# Patient Record
Sex: FEMALE | Race: WHITE | NOT HISPANIC OR LATINO | Employment: OTHER | URBAN - METROPOLITAN AREA
[De-identification: names, ages, dates, MRNs, and addresses within clinical notes are randomized per-mention and may not be internally consistent; named-entity substitution may affect disease eponyms.]

---

## 2017-01-10 DIAGNOSIS — I48.0 PAROXYSMAL ATRIAL FIBRILLATION (HCC): ICD-10-CM

## 2017-01-16 ENCOUNTER — TRANSCRIBE ORDERS (OUTPATIENT)
Dept: ADMINISTRATIVE | Facility: HOSPITAL | Age: 72
End: 2017-01-16

## 2017-01-16 DIAGNOSIS — Z13.9 SCREENING: Primary | ICD-10-CM

## 2017-01-18 ENCOUNTER — HOSPITAL ENCOUNTER (OUTPATIENT)
Dept: MAMMOGRAPHY | Facility: HOSPITAL | Age: 72
Discharge: HOME | End: 2017-01-18
Admitting: FAMILY MEDICINE

## 2017-01-18 DIAGNOSIS — Z13.9 SCREENING: ICD-10-CM

## 2017-01-18 PROCEDURE — G0202 SCR MAMMO BI INCL CAD: HCPCS

## 2017-01-18 PROCEDURE — 77063 BREAST TOMOSYNTHESIS BI: CPT

## 2017-01-30 ENCOUNTER — TELEPHONE (OUTPATIENT)
Dept: CARDIOLOGY | Facility: CLINIC | Age: 72
End: 2017-01-30

## 2017-01-30 NOTE — TELEPHONE ENCOUNTER
Pt is calling to get surgery clearance for total right knee replacement on 2/14/2016 by . Pt last seen you on 12/8/2016. Please Advise. Fax #:[phone]    Thanks  Debbie

## 2017-01-31 ENCOUNTER — APPOINTMENT (OUTPATIENT)
Dept: PREADMISSION TESTING | Facility: HOSPITAL | Age: 72
End: 2017-01-31

## 2017-01-31 ENCOUNTER — HOSPITAL ENCOUNTER (OUTPATIENT)
Dept: GENERAL RADIOLOGY | Facility: HOSPITAL | Age: 72
Discharge: HOME | End: 2017-01-31
Admitting: ORTHOPAEDIC SURGERY

## 2017-01-31 VITALS
HEART RATE: 61 BPM | RESPIRATION RATE: 16 BRPM | DIASTOLIC BLOOD PRESSURE: 66 MMHG | SYSTOLIC BLOOD PRESSURE: 134 MMHG | BODY MASS INDEX: 28.51 KG/M2 | OXYGEN SATURATION: 98 % | HEIGHT: 64 IN | TEMPERATURE: 98.3 F | WEIGHT: 167 LBS

## 2017-01-31 DIAGNOSIS — G89.29 CHRONIC PAIN OF RIGHT KNEE: Primary | ICD-10-CM

## 2017-01-31 DIAGNOSIS — M25.561 CHRONIC PAIN OF RIGHT KNEE: Primary | ICD-10-CM

## 2017-01-31 LAB
ABO GROUP BLD: NORMAL
ALBUMIN SERPL-MCNC: 4.1 G/DL (ref 3.5–5.2)
ALBUMIN/GLOB SERPL: 1.7 G/DL
ALP SERPL-CCNC: 69 U/L (ref 39–117)
ALT SERPL W P-5'-P-CCNC: 10 U/L (ref 1–33)
ANION GAP SERPL CALCULATED.3IONS-SCNC: 13.9 MMOL/L
AST SERPL-CCNC: 13 U/L (ref 1–32)
BASOPHILS # BLD AUTO: 0.03 10*3/MM3 (ref 0–0.2)
BASOPHILS NFR BLD AUTO: 0.5 % (ref 0–1.5)
BILIRUB SERPL-MCNC: 0.4 MG/DL (ref 0.1–1.2)
BILIRUB UR QL STRIP: NEGATIVE
BLD GP AB SCN SERPL QL: NEGATIVE
BUN BLD-MCNC: 15 MG/DL (ref 8–23)
BUN/CREAT SERPL: 24.6 (ref 7–25)
CALCIUM SPEC-SCNC: 8.9 MG/DL (ref 8.6–10.5)
CHLORIDE SERPL-SCNC: 100 MMOL/L (ref 98–107)
CLARITY UR: CLEAR
CO2 SERPL-SCNC: 25.1 MMOL/L (ref 22–29)
COLOR UR: YELLOW
CREAT BLD-MCNC: 0.61 MG/DL (ref 0.57–1)
DEPRECATED RDW RBC AUTO: 44.1 FL (ref 37–54)
EOSINOPHIL # BLD AUTO: 0.26 10*3/MM3 (ref 0–0.7)
EOSINOPHIL NFR BLD AUTO: 4.2 % (ref 0.3–6.2)
ERYTHROCYTE [DISTWIDTH] IN BLOOD BY AUTOMATED COUNT: 13.4 % (ref 11.7–13)
GFR SERPL CREATININE-BSD FRML MDRD: 96 ML/MIN/1.73
GLOBULIN UR ELPH-MCNC: 2.4 GM/DL
GLUCOSE BLD-MCNC: 234 MG/DL (ref 65–99)
GLUCOSE UR STRIP-MCNC: NEGATIVE MG/DL
HCT VFR BLD AUTO: 40.3 % (ref 35.6–45.5)
HGB BLD-MCNC: 12.5 G/DL (ref 11.9–15.5)
HGB UR QL STRIP.AUTO: NEGATIVE
IMM GRANULOCYTES # BLD: 0.02 10*3/MM3 (ref 0–0.03)
IMM GRANULOCYTES NFR BLD: 0.3 % (ref 0–0.5)
KETONES UR QL STRIP: NEGATIVE
LEUKOCYTE ESTERASE UR QL STRIP.AUTO: NEGATIVE
LYMPHOCYTES # BLD AUTO: 1.76 10*3/MM3 (ref 0.9–4.8)
LYMPHOCYTES NFR BLD AUTO: 28.2 % (ref 19.6–45.3)
MCH RBC QN AUTO: 28 PG (ref 26.9–32)
MCHC RBC AUTO-ENTMCNC: 31 G/DL (ref 32.4–36.3)
MCV RBC AUTO: 90.4 FL (ref 80.5–98.2)
MONOCYTES # BLD AUTO: 0.37 10*3/MM3 (ref 0.2–1.2)
MONOCYTES NFR BLD AUTO: 5.9 % (ref 5–12)
NEUTROPHILS # BLD AUTO: 3.8 10*3/MM3 (ref 1.9–8.1)
NEUTROPHILS NFR BLD AUTO: 60.9 % (ref 42.7–76)
NITRITE UR QL STRIP: NEGATIVE
PH UR STRIP.AUTO: 6.5 [PH] (ref 5–8)
PLATELET # BLD AUTO: 284 10*3/MM3 (ref 140–500)
PMV BLD AUTO: 10.4 FL (ref 6–12)
POTASSIUM BLD-SCNC: 4 MMOL/L (ref 3.5–5.2)
PROT SERPL-MCNC: 6.5 G/DL (ref 6–8.5)
PROT UR QL STRIP: NEGATIVE
RBC # BLD AUTO: 4.46 10*6/MM3 (ref 3.9–5.2)
RH BLD: POSITIVE
SODIUM BLD-SCNC: 139 MMOL/L (ref 136–145)
SP GR UR STRIP: 1.02 (ref 1–1.03)
UROBILINOGEN UR QL STRIP: NORMAL
WBC NRBC COR # BLD: 6.24 10*3/MM3 (ref 4.5–10.7)

## 2017-01-31 PROCEDURE — G8979 MOBILITY GOAL STATUS: HCPCS

## 2017-01-31 PROCEDURE — 97161 PT EVAL LOW COMPLEX 20 MIN: CPT

## 2017-01-31 PROCEDURE — 80053 COMPREHEN METABOLIC PANEL: CPT | Performed by: ORTHOPAEDIC SURGERY

## 2017-01-31 PROCEDURE — 86901 BLOOD TYPING SEROLOGIC RH(D): CPT

## 2017-01-31 PROCEDURE — 73560 X-RAY EXAM OF KNEE 1 OR 2: CPT

## 2017-01-31 PROCEDURE — G8978 MOBILITY CURRENT STATUS: HCPCS

## 2017-01-31 PROCEDURE — 86850 RBC ANTIBODY SCREEN: CPT

## 2017-01-31 PROCEDURE — 86900 BLOOD TYPING SEROLOGIC ABO: CPT

## 2017-01-31 PROCEDURE — 36415 COLL VENOUS BLD VENIPUNCTURE: CPT

## 2017-01-31 PROCEDURE — 85025 COMPLETE CBC W/AUTO DIFF WBC: CPT | Performed by: ORTHOPAEDIC SURGERY

## 2017-01-31 PROCEDURE — G8980 MOBILITY D/C STATUS: HCPCS

## 2017-01-31 PROCEDURE — 81003 URINALYSIS AUTO W/O SCOPE: CPT | Performed by: ORTHOPAEDIC SURGERY

## 2017-01-31 RX ORDER — AMOXICILLIN 250 MG
2 CAPSULE ORAL AS NEEDED
COMMUNITY
End: 2022-03-16

## 2017-01-31 RX ORDER — DOCUSATE SODIUM 100 MG/1
100 CAPSULE, LIQUID FILLED ORAL 2 TIMES DAILY PRN
COMMUNITY
End: 2017-02-16 | Stop reason: HOSPADM

## 2017-02-06 NOTE — PROGRESS NOTES
Pre-Operative Orthopedic Assessment      Patient: Carli Blair    YOB: 1945      Age/Gender: 72 y.o. female  Medical Record Number: [id number]  Surgical Procedure Planned: CO TOTAL KNEE ARTHROPLASTY [54228] (RT TOTAL KNEE ARTHROPLASTY)     Surgeon: Giovany Poole MD    Date of Surgery Planned: 2/14/17    Question Value Score    Patient Score   1. What is your age group? 50-65 years  66-75 years  >75 years =2  =1  =0 1   2. Gender? Male  Female =2  =1 1   3. How far on average can you walk? (a block is 200 meters) Two blocks or more (+\- rest)  1-2 blocks (+\- rest)  Housebound (most of time) =2  =1  =0 2   4. Which gait aid to you use? (more often than not) None  Single-Point Stick  Crutches/Frame =2  =1  =0 2   5. Do you use community  supports? (home help, meals on wheels, district nursing) None or one per week  Two or more per week =1  =0 0   6. Will you live with someone who can care for you after your operation? Yes  No =3  =0 0      Your Score (out of 12)  6     Key Destination at Discharge from acute care predicted by score   Scores < 6  -- extended inpatient rehabilitation   Scores 6-9 -- additional intervention to discharge directly home (Rehabilitation in home)   Scores > 9 -- directly home         Discharge Disposition/Planning:     Patient Response   Discussed the Predicted discharge disposition needed based on RAPT Assessment with the patient.    yes   Patient selected discharge disposition:   Home with [facility]   Out Patient Rehabilitation Facility of Choice:    [facility]   Home Health Services Preferred:   [facility]   Has the patient completed or been scheduled to complete pre-operative testing? yes   Post-Operative Care Giver Name and Phone Number:    Fidel Blair [phone]     Subacute Inpatient Rehabilitation:  Complete this section only if planning inpatient services at a Subacute Facility     Patient Response   Subacute Facility Preferred (Please  list 2 facilities:      Requires pre-certification for inpatient rehabilitation services?         Planned source of transportation to inpatient rehabilitation facility?       If choosing inpatient services at an Acute or Subacute Facility please list a subsequent back-up plan (in case patient fails to qualify for inpatient rehabilitation). Back-up plans should include caregiver (family member or friend) for first 24-48 post- -operatively.       Home Equipment Patient Response   Does patient have a walker for home use?    yes   Does patient have a 3 in 1 commode for home use?    no   Does patient have a shower chair for home use?    no   Does patient have an elevated commode seat for home use? yes   Does patient have a cane for home use?    no   Is there any other medical equipment in the home? If so,  List in comment section below n/a   Pre-Operative Class Attendance Patient Response   Attended or scheduled to attend the pre-operative class within 1 year of total joint replacement? Yes 1/31/17   Not planning to attend the pre-operative class       Has attended the pre-operative class > 1 year ago       Does not want to attend the class       Was misinformed that did not need to attend the class       Class time is not convenient       Other reasons for refusing to attend the pre-operative class (if yes, see comments below)      Patient Education  Completed   Expected time of discharge discussed yes   Encouraged to attend Pre-Operative Class    yes   Education re: Back-up plan for patients planning discharge to subacute facilities yes   Education re: Predicted Discharge Disposition based on RAPT score    yes   Patient receptive and voiced understanding of information given    yes                                                                                                            Comments:    Spoke with pt, verified correct information on facesheet and explained the role of CCP. Pt states she had her left knee  done a year ago but still plans on coming to the PAT class. Pt would like to d/c home with [facility] and will go to [facility] for outpatient. Pt states she lives in a single story home and has 3-4 steps to enter. No other needs identified.                                       Signature: Carissa Busch RN    Date:  2/6/2017

## 2017-02-14 ENCOUNTER — ANESTHESIA (OUTPATIENT)
Dept: PERIOP | Facility: HOSPITAL | Age: 72
End: 2017-02-14

## 2017-02-14 ENCOUNTER — ANESTHESIA EVENT (OUTPATIENT)
Dept: PERIOP | Facility: HOSPITAL | Age: 72
End: 2017-02-14

## 2017-02-14 ENCOUNTER — HOSPITAL ENCOUNTER (INPATIENT)
Facility: HOSPITAL | Age: 72
LOS: 2 days | Discharge: HOME HEALTH | End: 2017-02-16
Attending: ORTHOPAEDIC SURGERY | Admitting: ORTHOPAEDIC SURGERY

## 2017-02-14 DIAGNOSIS — R26.2 DIFFICULTY WALKING: Primary | ICD-10-CM

## 2017-02-14 LAB
GLUCOSE BLDC GLUCOMTR-MCNC: 157 MG/DL (ref 70–130)
GLUCOSE BLDC GLUCOMTR-MCNC: 159 MG/DL (ref 70–130)
GLUCOSE BLDC GLUCOMTR-MCNC: 232 MG/DL (ref 70–130)
GLUCOSE BLDC GLUCOMTR-MCNC: 307 MG/DL (ref 70–130)

## 2017-02-14 PROCEDURE — 25010000002 ONDANSETRON PER 1 MG: Performed by: ORTHOPAEDIC SURGERY

## 2017-02-14 PROCEDURE — 94799 UNLISTED PULMONARY SVC/PX: CPT

## 2017-02-14 PROCEDURE — 25010000002 ONDANSETRON PER 1 MG: Performed by: NURSE ANESTHETIST, CERTIFIED REGISTERED

## 2017-02-14 PROCEDURE — 25010000002 MIDAZOLAM PER 1 MG: Performed by: ANESTHESIOLOGY

## 2017-02-14 PROCEDURE — C1776 JOINT DEVICE (IMPLANTABLE): HCPCS | Performed by: ORTHOPAEDIC SURGERY

## 2017-02-14 PROCEDURE — 0SRC0J9 REPLACEMENT OF RIGHT KNEE JOINT WITH SYNTHETIC SUBSTITUTE, CEMENTED, OPEN APPROACH: ICD-10-PCS | Performed by: ORTHOPAEDIC SURGERY

## 2017-02-14 PROCEDURE — C1713 ANCHOR/SCREW BN/BN,TIS/BN: HCPCS | Performed by: ORTHOPAEDIC SURGERY

## 2017-02-14 PROCEDURE — 63710000001 INSULIN ASPART PER 5 UNITS: Performed by: HOSPITALIST

## 2017-02-14 PROCEDURE — 25010000003 CEFAZOLIN IN DEXTROSE 2-4 GM/100ML-% SOLUTION: Performed by: ORTHOPAEDIC SURGERY

## 2017-02-14 PROCEDURE — 25010000002 HYDROMORPHONE PER 4 MG: Performed by: NURSE ANESTHETIST, CERTIFIED REGISTERED

## 2017-02-14 PROCEDURE — 25010000002 PROPOFOL 10 MG/ML EMULSION: Performed by: NURSE ANESTHETIST, CERTIFIED REGISTERED

## 2017-02-14 PROCEDURE — 0T9B70Z DRAINAGE OF BLADDER WITH DRAINAGE DEVICE, VIA NATURAL OR ARTIFICIAL OPENING: ICD-10-PCS | Performed by: ORTHOPAEDIC SURGERY

## 2017-02-14 PROCEDURE — 25010000002 KETOROLAC TROMETHAMINE PER 15 MG: Performed by: ORTHOPAEDIC SURGERY

## 2017-02-14 PROCEDURE — 97110 THERAPEUTIC EXERCISES: CPT

## 2017-02-14 PROCEDURE — 97161 PT EVAL LOW COMPLEX 20 MIN: CPT

## 2017-02-14 PROCEDURE — 25010000002 FENTANYL CITRATE (PF) 100 MCG/2ML SOLUTION: Performed by: NURSE ANESTHETIST, CERTIFIED REGISTERED

## 2017-02-14 PROCEDURE — 82962 GLUCOSE BLOOD TEST: CPT

## 2017-02-14 PROCEDURE — 25010000002 SUCCINYLCHOLINE PER 20 MG: Performed by: NURSE ANESTHETIST, CERTIFIED REGISTERED

## 2017-02-14 PROCEDURE — 63710000001 INSULIN DETEMER PER 5 UNITS: Performed by: ORTHOPAEDIC SURGERY

## 2017-02-14 DEVICE — IMPLANTABLE DEVICE
Type: IMPLANTABLE DEVICE | Site: KNEE | Status: FUNCTIONAL
Brand: VANGUARD® KNEE SYSTEM

## 2017-02-14 DEVICE — IMPLANTABLE DEVICE
Type: IMPLANTABLE DEVICE | Site: KNEE | Status: FUNCTIONAL
Brand: VANGUARD KNEE SYSTEM

## 2017-02-14 DEVICE — IMPLANTABLE DEVICE
Type: IMPLANTABLE DEVICE | Site: KNEE | Status: FUNCTIONAL
Brand: BIOMET KNEE SYSTEM

## 2017-02-14 DEVICE — CAP TOTL KN CMT PREMIUM: Type: IMPLANTABLE DEVICE | Site: KNEE | Status: FUNCTIONAL

## 2017-02-14 DEVICE — IMPLANTABLE DEVICE: Type: IMPLANTABLE DEVICE | Site: KNEE | Status: FUNCTIONAL

## 2017-02-14 RX ORDER — HYDROCODONE BITARTRATE AND ACETAMINOPHEN 7.5; 325 MG/1; MG/1
1 TABLET ORAL EVERY 4 HOURS PRN
Status: DISCONTINUED | OUTPATIENT
Start: 2017-02-14 | End: 2017-02-16 | Stop reason: HOSPADM

## 2017-02-14 RX ORDER — NALOXONE HCL 0.4 MG/ML
0.2 VIAL (ML) INJECTION AS NEEDED
Status: DISCONTINUED | OUTPATIENT
Start: 2017-02-14 | End: 2017-02-14 | Stop reason: HOSPADM

## 2017-02-14 RX ORDER — SODIUM CHLORIDE, SODIUM LACTATE, POTASSIUM CHLORIDE, CALCIUM CHLORIDE 600; 310; 30; 20 MG/100ML; MG/100ML; MG/100ML; MG/100ML
9 INJECTION, SOLUTION INTRAVENOUS CONTINUOUS
Status: DISCONTINUED | OUTPATIENT
Start: 2017-02-14 | End: 2017-02-16 | Stop reason: HOSPADM

## 2017-02-14 RX ORDER — HYDROMORPHONE HYDROCHLORIDE 1 MG/ML
0.25 INJECTION, SOLUTION INTRAMUSCULAR; INTRAVENOUS; SUBCUTANEOUS
Status: DISCONTINUED | OUTPATIENT
Start: 2017-02-14 | End: 2017-02-14 | Stop reason: HOSPADM

## 2017-02-14 RX ORDER — MAGNESIUM HYDROXIDE 1200 MG/15ML
LIQUID ORAL AS NEEDED
Status: DISCONTINUED | OUTPATIENT
Start: 2017-02-14 | End: 2017-02-14 | Stop reason: HOSPADM

## 2017-02-14 RX ORDER — SODIUM CHLORIDE 0.9 % (FLUSH) 0.9 %
1-10 SYRINGE (ML) INJECTION AS NEEDED
Status: DISCONTINUED | OUTPATIENT
Start: 2017-02-14 | End: 2017-02-16 | Stop reason: HOSPADM

## 2017-02-14 RX ORDER — LIDOCAINE HYDROCHLORIDE 20 MG/ML
INJECTION, SOLUTION INFILTRATION; PERINEURAL AS NEEDED
Status: DISCONTINUED | OUTPATIENT
Start: 2017-02-14 | End: 2017-02-14 | Stop reason: SURG

## 2017-02-14 RX ORDER — PROPOFOL 10 MG/ML
VIAL (ML) INTRAVENOUS AS NEEDED
Status: DISCONTINUED | OUTPATIENT
Start: 2017-02-14 | End: 2017-02-14 | Stop reason: SURG

## 2017-02-14 RX ORDER — MIDAZOLAM HYDROCHLORIDE 1 MG/ML
1 INJECTION INTRAMUSCULAR; INTRAVENOUS
Status: DISCONTINUED | OUTPATIENT
Start: 2017-02-14 | End: 2017-02-14 | Stop reason: HOSPADM

## 2017-02-14 RX ORDER — DOCUSATE SODIUM 100 MG/1
100 CAPSULE, LIQUID FILLED ORAL 2 TIMES DAILY PRN
Status: DISCONTINUED | OUTPATIENT
Start: 2017-02-14 | End: 2017-02-16 | Stop reason: HOSPADM

## 2017-02-14 RX ORDER — HYDRALAZINE HYDROCHLORIDE 20 MG/ML
5 INJECTION INTRAMUSCULAR; INTRAVENOUS
Status: DISCONTINUED | OUTPATIENT
Start: 2017-02-14 | End: 2017-02-14 | Stop reason: HOSPADM

## 2017-02-14 RX ORDER — SUCCINYLCHOLINE CHLORIDE 20 MG/ML
INJECTION INTRAMUSCULAR; INTRAVENOUS AS NEEDED
Status: DISCONTINUED | OUTPATIENT
Start: 2017-02-14 | End: 2017-02-14 | Stop reason: SURG

## 2017-02-14 RX ORDER — NICOTINE POLACRILEX 4 MG
15 LOZENGE BUCCAL
Status: DISCONTINUED | OUTPATIENT
Start: 2017-02-14 | End: 2017-02-16 | Stop reason: HOSPADM

## 2017-02-14 RX ORDER — ROCURONIUM BROMIDE 10 MG/ML
INJECTION, SOLUTION INTRAVENOUS AS NEEDED
Status: DISCONTINUED | OUTPATIENT
Start: 2017-02-14 | End: 2017-02-14 | Stop reason: SURG

## 2017-02-14 RX ORDER — PRAVASTATIN SODIUM 40 MG
40 TABLET ORAL DAILY
Status: DISCONTINUED | OUTPATIENT
Start: 2017-02-14 | End: 2017-02-16 | Stop reason: HOSPADM

## 2017-02-14 RX ORDER — ONDANSETRON 2 MG/ML
4 INJECTION INTRAMUSCULAR; INTRAVENOUS ONCE AS NEEDED
Status: DISCONTINUED | OUTPATIENT
Start: 2017-02-14 | End: 2017-02-14 | Stop reason: HOSPADM

## 2017-02-14 RX ORDER — LABETALOL HYDROCHLORIDE 5 MG/ML
5 INJECTION, SOLUTION INTRAVENOUS
Status: DISCONTINUED | OUTPATIENT
Start: 2017-02-14 | End: 2017-02-14 | Stop reason: HOSPADM

## 2017-02-14 RX ORDER — METOPROLOL SUCCINATE 25 MG/1
25 TABLET, EXTENDED RELEASE ORAL EVERY MORNING
Status: DISCONTINUED | OUTPATIENT
Start: 2017-02-14 | End: 2017-02-16 | Stop reason: HOSPADM

## 2017-02-14 RX ORDER — ONDANSETRON 4 MG/1
4 TABLET, FILM COATED ORAL EVERY 6 HOURS PRN
Status: DISCONTINUED | OUTPATIENT
Start: 2017-02-14 | End: 2017-02-16 | Stop reason: HOSPADM

## 2017-02-14 RX ORDER — LEVOTHYROXINE SODIUM 112 UG/1
112 TABLET ORAL DAILY
Status: DISCONTINUED | OUTPATIENT
Start: 2017-02-14 | End: 2017-02-16 | Stop reason: HOSPADM

## 2017-02-14 RX ORDER — PROMETHAZINE HYDROCHLORIDE 25 MG/ML
6.25 INJECTION, SOLUTION INTRAMUSCULAR; INTRAVENOUS ONCE AS NEEDED
Status: DISCONTINUED | OUTPATIENT
Start: 2017-02-14 | End: 2017-02-14 | Stop reason: HOSPADM

## 2017-02-14 RX ORDER — ACETAMINOPHEN 10 MG/ML
INJECTION, SOLUTION INTRAVENOUS AS NEEDED
Status: DISCONTINUED | OUTPATIENT
Start: 2017-02-14 | End: 2017-02-14 | Stop reason: SURG

## 2017-02-14 RX ORDER — HYDROMORPHONE HCL 110MG/55ML
PATIENT CONTROLLED ANALGESIA SYRINGE INTRAVENOUS AS NEEDED
Status: DISCONTINUED | OUTPATIENT
Start: 2017-02-14 | End: 2017-02-14 | Stop reason: SURG

## 2017-02-14 RX ORDER — CETIRIZINE HYDROCHLORIDE 10 MG/1
10 TABLET ORAL DAILY
Status: DISCONTINUED | OUTPATIENT
Start: 2017-02-14 | End: 2017-02-16 | Stop reason: HOSPADM

## 2017-02-14 RX ORDER — FAMOTIDINE 10 MG/ML
20 INJECTION, SOLUTION INTRAVENOUS ONCE
Status: COMPLETED | OUTPATIENT
Start: 2017-02-14 | End: 2017-02-14

## 2017-02-14 RX ORDER — PROMETHAZINE HYDROCHLORIDE 25 MG/ML
12.5 INJECTION, SOLUTION INTRAMUSCULAR; INTRAVENOUS ONCE AS NEEDED
Status: DISCONTINUED | OUTPATIENT
Start: 2017-02-14 | End: 2017-02-14 | Stop reason: HOSPADM

## 2017-02-14 RX ORDER — MIDAZOLAM HYDROCHLORIDE 1 MG/ML
2 INJECTION INTRAMUSCULAR; INTRAVENOUS
Status: DISCONTINUED | OUTPATIENT
Start: 2017-02-14 | End: 2017-02-14 | Stop reason: HOSPADM

## 2017-02-14 RX ORDER — FLUMAZENIL 0.1 MG/ML
0.2 INJECTION INTRAVENOUS AS NEEDED
Status: DISCONTINUED | OUTPATIENT
Start: 2017-02-14 | End: 2017-02-14 | Stop reason: HOSPADM

## 2017-02-14 RX ORDER — CALCIUM CARBONATE 200(500)MG
1 TABLET,CHEWABLE ORAL DAILY
Status: DISCONTINUED | OUTPATIENT
Start: 2017-02-14 | End: 2017-02-16 | Stop reason: HOSPADM

## 2017-02-14 RX ORDER — PROMETHAZINE HYDROCHLORIDE 25 MG/1
25 SUPPOSITORY RECTAL ONCE AS NEEDED
Status: DISCONTINUED | OUTPATIENT
Start: 2017-02-14 | End: 2017-02-14 | Stop reason: HOSPADM

## 2017-02-14 RX ORDER — CEFAZOLIN SODIUM 2 G/100ML
2 INJECTION, SOLUTION INTRAVENOUS EVERY 8 HOURS
Status: COMPLETED | OUTPATIENT
Start: 2017-02-14 | End: 2017-02-15

## 2017-02-14 RX ORDER — DEXTROSE MONOHYDRATE 25 G/50ML
25 INJECTION, SOLUTION INTRAVENOUS
Status: DISCONTINUED | OUTPATIENT
Start: 2017-02-14 | End: 2017-02-16 | Stop reason: HOSPADM

## 2017-02-14 RX ORDER — ONDANSETRON 2 MG/ML
INJECTION INTRAMUSCULAR; INTRAVENOUS AS NEEDED
Status: DISCONTINUED | OUTPATIENT
Start: 2017-02-14 | End: 2017-02-14 | Stop reason: SURG

## 2017-02-14 RX ORDER — ONDANSETRON 2 MG/ML
4 INJECTION INTRAMUSCULAR; INTRAVENOUS EVERY 6 HOURS PRN
Status: DISCONTINUED | OUTPATIENT
Start: 2017-02-14 | End: 2017-02-16 | Stop reason: HOSPADM

## 2017-02-14 RX ORDER — SODIUM CHLORIDE 0.9 % (FLUSH) 0.9 %
1-10 SYRINGE (ML) INJECTION AS NEEDED
Status: DISCONTINUED | OUTPATIENT
Start: 2017-02-14 | End: 2017-02-14 | Stop reason: HOSPADM

## 2017-02-14 RX ORDER — CALCIUM CARBONATE 200(500)MG
1 TABLET,CHEWABLE ORAL DAILY
COMMUNITY
End: 2017-06-07

## 2017-02-14 RX ORDER — SODIUM CHLORIDE, SODIUM LACTATE, POTASSIUM CHLORIDE, CALCIUM CHLORIDE 600; 310; 30; 20 MG/100ML; MG/100ML; MG/100ML; MG/100ML
75 INJECTION, SOLUTION INTRAVENOUS CONTINUOUS
Status: DISCONTINUED | OUTPATIENT
Start: 2017-02-14 | End: 2017-02-15

## 2017-02-14 RX ORDER — DIPHENHYDRAMINE HYDROCHLORIDE 50 MG/ML
12.5 INJECTION INTRAMUSCULAR; INTRAVENOUS
Status: DISCONTINUED | OUTPATIENT
Start: 2017-02-14 | End: 2017-02-14 | Stop reason: HOSPADM

## 2017-02-14 RX ORDER — HYDROMORPHONE HYDROCHLORIDE 1 MG/ML
0.5 INJECTION, SOLUTION INTRAMUSCULAR; INTRAVENOUS; SUBCUTANEOUS
Status: DISCONTINUED | OUTPATIENT
Start: 2017-02-14 | End: 2017-02-16 | Stop reason: HOSPADM

## 2017-02-14 RX ORDER — FENTANYL CITRATE 50 UG/ML
50 INJECTION, SOLUTION INTRAMUSCULAR; INTRAVENOUS
Status: DISCONTINUED | OUTPATIENT
Start: 2017-02-14 | End: 2017-02-14 | Stop reason: HOSPADM

## 2017-02-14 RX ORDER — KETOROLAC TROMETHAMINE 30 MG/ML
15 INJECTION, SOLUTION INTRAMUSCULAR; INTRAVENOUS EVERY 6 HOURS
Status: COMPLETED | OUTPATIENT
Start: 2017-02-14 | End: 2017-02-15

## 2017-02-14 RX ORDER — CEFAZOLIN SODIUM 2 G/100ML
2 INJECTION, SOLUTION INTRAVENOUS ONCE
Status: COMPLETED | OUTPATIENT
Start: 2017-02-14 | End: 2017-02-14

## 2017-02-14 RX ORDER — ONDANSETRON 4 MG/1
4 TABLET, ORALLY DISINTEGRATING ORAL EVERY 6 HOURS PRN
Status: DISCONTINUED | OUTPATIENT
Start: 2017-02-14 | End: 2017-02-16 | Stop reason: HOSPADM

## 2017-02-14 RX ORDER — NALOXONE HCL 0.4 MG/ML
0.1 VIAL (ML) INJECTION
Status: DISCONTINUED | OUTPATIENT
Start: 2017-02-14 | End: 2017-02-16 | Stop reason: HOSPADM

## 2017-02-14 RX ORDER — SENNA AND DOCUSATE SODIUM 50; 8.6 MG/1; MG/1
2 TABLET, FILM COATED ORAL DAILY
Status: DISCONTINUED | OUTPATIENT
Start: 2017-02-14 | End: 2017-02-16 | Stop reason: HOSPADM

## 2017-02-14 RX ORDER — ACETAMINOPHEN 500 MG
1000 TABLET ORAL EVERY 6 HOURS PRN
Status: DISCONTINUED | OUTPATIENT
Start: 2017-02-14 | End: 2017-02-16 | Stop reason: HOSPADM

## 2017-02-14 RX ORDER — HYDROMORPHONE HYDROCHLORIDE 1 MG/ML
0.5 INJECTION, SOLUTION INTRAMUSCULAR; INTRAVENOUS; SUBCUTANEOUS
Status: DISCONTINUED | OUTPATIENT
Start: 2017-02-14 | End: 2017-02-14 | Stop reason: HOSPADM

## 2017-02-14 RX ORDER — FENTANYL CITRATE 50 UG/ML
INJECTION, SOLUTION INTRAMUSCULAR; INTRAVENOUS AS NEEDED
Status: DISCONTINUED | OUTPATIENT
Start: 2017-02-14 | End: 2017-02-14 | Stop reason: SURG

## 2017-02-14 RX ORDER — ACETAMINOPHEN 325 MG/1
325 TABLET ORAL 4 TIMES DAILY
Status: DISCONTINUED | OUTPATIENT
Start: 2017-02-14 | End: 2017-02-16 | Stop reason: HOSPADM

## 2017-02-14 RX ORDER — PROMETHAZINE HYDROCHLORIDE 25 MG/1
25 TABLET ORAL ONCE AS NEEDED
Status: DISCONTINUED | OUTPATIENT
Start: 2017-02-14 | End: 2017-02-14 | Stop reason: HOSPADM

## 2017-02-14 RX ORDER — PAROXETINE 30 MG/1
30 TABLET, FILM COATED ORAL DAILY
Status: DISCONTINUED | OUTPATIENT
Start: 2017-02-14 | End: 2017-02-16 | Stop reason: HOSPADM

## 2017-02-14 RX ORDER — DIPHENHYDRAMINE HCL 25 MG
25 CAPSULE ORAL EVERY 6 HOURS PRN
Status: DISCONTINUED | OUTPATIENT
Start: 2017-02-14 | End: 2017-02-16 | Stop reason: HOSPADM

## 2017-02-14 RX ADMIN — HYDROMORPHONE HYDROCHLORIDE 0.5 MG: 2 INJECTION, SOLUTION INTRAMUSCULAR; INTRAVENOUS; SUBCUTANEOUS at 08:30

## 2017-02-14 RX ADMIN — HYDROCODONE BITARTRATE AND ACETAMINOPHEN 1 TABLET: 7.5; 325 TABLET ORAL at 11:57

## 2017-02-14 RX ADMIN — FENTANYL CITRATE 100 MCG: 50 INJECTION, SOLUTION INTRAMUSCULAR; INTRAVENOUS at 07:01

## 2017-02-14 RX ADMIN — INSULIN DETEMIR 25 UNITS: 100 INJECTION, SOLUTION SUBCUTANEOUS at 22:28

## 2017-02-14 RX ADMIN — METFORMIN HYDROCHLORIDE 500 MG: 500 TABLET ORAL at 17:03

## 2017-02-14 RX ADMIN — ONDANSETRON 4 MG: 2 INJECTION INTRAMUSCULAR; INTRAVENOUS at 12:48

## 2017-02-14 RX ADMIN — SODIUM CHLORIDE, POTASSIUM CHLORIDE, SODIUM LACTATE AND CALCIUM CHLORIDE 75 ML/HR: 600; 310; 30; 20 INJECTION, SOLUTION INTRAVENOUS at 12:48

## 2017-02-14 RX ADMIN — KETOROLAC TROMETHAMINE 15 MG: 30 INJECTION, SOLUTION INTRAMUSCULAR at 22:03

## 2017-02-14 RX ADMIN — HYDROCODONE BITARTRATE AND ACETAMINOPHEN 1 TABLET: 7.5; 325 TABLET ORAL at 16:12

## 2017-02-14 RX ADMIN — MIDAZOLAM 2 MG: 1 INJECTION INTRAMUSCULAR; INTRAVENOUS at 06:48

## 2017-02-14 RX ADMIN — FENTANYL CITRATE 50 MCG: 50 INJECTION INTRAMUSCULAR; INTRAVENOUS at 08:56

## 2017-02-14 RX ADMIN — HYDROMORPHONE HYDROCHLORIDE 0.5 MG: 2 INJECTION, SOLUTION INTRAMUSCULAR; INTRAVENOUS; SUBCUTANEOUS at 08:05

## 2017-02-14 RX ADMIN — HYDROMORPHONE HYDROCHLORIDE 0.5 MG: 2 INJECTION, SOLUTION INTRAMUSCULAR; INTRAVENOUS; SUBCUTANEOUS at 08:18

## 2017-02-14 RX ADMIN — ONDANSETRON 4 MG: 2 INJECTION INTRAMUSCULAR; INTRAVENOUS at 07:45

## 2017-02-14 RX ADMIN — ROCURONIUM BROMIDE 10 MG: 10 INJECTION INTRAVENOUS at 07:01

## 2017-02-14 RX ADMIN — KETOROLAC TROMETHAMINE 15 MG: 30 INJECTION, SOLUTION INTRAMUSCULAR at 18:46

## 2017-02-14 RX ADMIN — SODIUM CHLORIDE, POTASSIUM CHLORIDE, SODIUM LACTATE AND CALCIUM CHLORIDE 9 ML/HR: 600; 310; 30; 20 INJECTION, SOLUTION INTRAVENOUS at 06:47

## 2017-02-14 RX ADMIN — LIDOCAINE HYDROCHLORIDE 60 MG: 20 INJECTION, SOLUTION INFILTRATION; PERINEURAL at 07:01

## 2017-02-14 RX ADMIN — KETOROLAC TROMETHAMINE 15 MG: 30 INJECTION, SOLUTION INTRAMUSCULAR at 12:53

## 2017-02-14 RX ADMIN — FENTANYL CITRATE 50 MCG: 50 INJECTION, SOLUTION INTRAMUSCULAR; INTRAVENOUS at 07:15

## 2017-02-14 RX ADMIN — CEFAZOLIN SODIUM 2 G: 2 INJECTION, SOLUTION INTRAVENOUS at 06:59

## 2017-02-14 RX ADMIN — SUCCINYLCHOLINE CHLORIDE 120 MG: 20 INJECTION, SOLUTION INTRAMUSCULAR; INTRAVENOUS; PARENTERAL at 07:01

## 2017-02-14 RX ADMIN — CEFAZOLIN SODIUM 2 G: 2 INJECTION, SOLUTION INTRAVENOUS at 07:51

## 2017-02-14 RX ADMIN — FENTANYL CITRATE 50 MCG: 50 INJECTION, SOLUTION INTRAMUSCULAR; INTRAVENOUS at 07:21

## 2017-02-14 RX ADMIN — FAMOTIDINE 20 MG: 10 INJECTION, SOLUTION INTRAVENOUS at 06:47

## 2017-02-14 RX ADMIN — ACETAMINOPHEN 1000 MG: 10 INJECTION, SOLUTION INTRAVENOUS at 07:10

## 2017-02-14 RX ADMIN — CEFAZOLIN SODIUM 2 G: 2 INJECTION, SOLUTION INTRAVENOUS at 16:05

## 2017-02-14 RX ADMIN — FENTANYL CITRATE 50 MCG: 50 INJECTION, SOLUTION INTRAMUSCULAR; INTRAVENOUS at 07:25

## 2017-02-14 RX ADMIN — EPHEDRINE SULFATE 10 MG: 50 INJECTION INTRAMUSCULAR; INTRAVENOUS; SUBCUTANEOUS at 07:11

## 2017-02-14 RX ADMIN — PROPOFOL 150 MG: 10 INJECTION, EMULSION INTRAVENOUS at 07:01

## 2017-02-14 RX ADMIN — HYDROMORPHONE HYDROCHLORIDE 0.5 MG: 2 INJECTION, SOLUTION INTRAMUSCULAR; INTRAVENOUS; SUBCUTANEOUS at 08:36

## 2017-02-14 RX ADMIN — INSULIN ASPART 7 UNITS: 100 INJECTION, SOLUTION INTRAVENOUS; SUBCUTANEOUS at 22:02

## 2017-02-14 RX ADMIN — SODIUM CHLORIDE, POTASSIUM CHLORIDE, SODIUM LACTATE AND CALCIUM CHLORIDE: 600; 310; 30; 20 INJECTION, SOLUTION INTRAVENOUS at 07:38

## 2017-02-14 NOTE — PROGRESS NOTES
Discharge Planning Assessment  [facility]     Patient Name: Carli Blair  MRN: [id number]  Today's Date: 2/14/2017    Admit Date: 2/14/2017          Discharge Needs Assessment       02/14/17 1658    Living Environment    Lives With spouse    Living Arrangements house    Quality Of Family Relationships supportive;helpful;involved    Able to Return to Prior Living Arrangements yes    Discharge Needs Assessment    Equipment Currently Used at Home walker, standard    Equipment Needed After Discharge --   TBD     Transportation Available car;family or friend will provide            Discharge Plan       02/14/17 1659    Case Management/Social Work Plan    Plan home w/ spouse and [facility]     Patient/Family In Agreement With Plan yes    Additional Comments Spoke with pt, verified correct information on facesheet and explained the role of CCP. Pt would like to d/c home with [facility], referral left at [phone]. Plan will be to d/c home with HH and family support.        Discharge Placement     Facility/Agency Request Status Selected? Address Phone Number Fax Number    [facility] Accepted    Yes [address] [address] [phone]        Mikayla Cannon RN 2/14/2017 16:58    2/14/2017   @ 8058.Mikayla Cannon RN                             Demographic Summary     None            Functional Status     None            Psychosocial     None            Abuse/Neglect     None            Legal     None            Substance Abuse     None            Patient Forms       02/14/17 1658    Patient Forms    Provider Choice List Delivered    Delivered to Patient    Method of delivery In person          Mikayla Cannon RN

## 2017-02-14 NOTE — ANESTHESIA PREPROCEDURE EVALUATION
Anesthesia Evaluation     Patient summary reviewed and Nursing notes reviewed   history of anesthetic complications:  NPO Status: > 8 hours   Airway   Mallampati: II  TM distance: >3 FB  Neck ROM: full  Dental - normal exam     Pulmonary - normal exam   (+) shortness of breath, sleep apnea,   Cardiovascular - normal exam    (+) hypertension, valvular problems/murmurs, dysrhythmias Atrial Fib,       Neuro/Psych  (+) psychiatric history Anxiety and Depression,    GI/Hepatic/Renal/Endo    (+)  hiatal hernia, diabetes mellitus, hypothyroidism,     Musculoskeletal     (+) back pain, neck pain,   Abdominal    Substance History      OB/GYN          Other   (+) arthritis                                 Anesthesia Plan    ASA 3     general     Anesthetic plan and risks discussed with patient.
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
## 2017-02-14 NOTE — PLAN OF CARE
Problem: Patient Care Overview (Adult)  Goal: Plan of Care Review  Outcome: Ongoing (interventions implemented as appropriate)    02/14/17 0656   Coping/Psychosocial Response Interventions   Plan Of Care Reviewed With patient   Patient Care Overview   Progress no change       Goal: Adult Individualization and Mutuality  Outcome: Ongoing (interventions implemented as appropriate)    02/14/17 0656   Individualization   Patient Specific Preferences na       Goal: Discharge Needs Assessment  Outcome: Ongoing (interventions implemented as appropriate)    Problem: Perioperative Period (Adult)  Goal: Signs and Symptoms of Listed Potential Problems Will be Absent or Manageable (Perioperative Period)  Outcome: Ongoing (interventions implemented as appropriate)    02/14/17 0656   Perioperative Period   Problems Assessed (Perioperative Period) pain;wound complications;embolism;hemorrhage;hypothermia;infection   Problems Present (Perioperative Period) pain

## 2017-02-14 NOTE — BRIEF OP NOTE
TOTAL KNEE ARTHROPLASTY  Procedure Note    Carli Blair  2/14/2017    Pre-op Diagnosis:   oa r knee    Post-op Diagnosis:     same    Procedure/CPT® Codes:      Procedure(s):  RT TOTAL KNEE ARTHROPLASTY    Surgeon(s):  Giovany Poole MD    Anesthesia: General    Staff:   Circulator: Eula Truong RN  Scrub Person: Areli Howe  Vendor Representative: John Perkins  Assistant: Jono Tanner MD    Estimated Blood Loss: 50 cc    Specimens:                * No specimens in log *      Drains:   Drain/Device Site 02/14/17 0804 Right knee collapsible closed device (Active)       Urethral Catheter 02/14/17 0706 silver hydrogel antimicrobial coated 16 (Active)           Findings: oa knee    Complications: 0      Giovany Poole MD     Date: 2/14/2017  Time: 8:14 AM

## 2017-02-14 NOTE — ANESTHESIA POSTPROCEDURE EVALUATION
Patient: Carli Blair    Procedure Summary     Date Anesthesia Start Anesthesia Stop Room / Location    02/14/17 0657 0839  [facility]       Procedure Diagnosis Surgeon Provider    RT TOTAL KNEE ARTHROPLASTY (Right Knee) No diagnosis on file. MD Troy Spraks MD          Anesthesia Type: general  Last vitals  /77 (02/14/17 0910)    Temp 36.4 °C (97.6 °F) (02/14/17 0834)    Pulse 78 (02/14/17 0910)   Resp 18 (02/14/17 0910)    SpO2 93 % (02/14/17 0910)      Post Anesthesia Care and Evaluation    Patient location during evaluation: PACU  Patient participation: complete - patient participated  Level of consciousness: awake and alert  Pain management: adequate  Airway patency: patent  Anesthetic complications: No anesthetic complications    Cardiovascular status: acceptable  Respiratory status: acceptable  Hydration status: acceptable

## 2017-02-14 NOTE — PLAN OF CARE
Problem: Patient Care Overview (Adult)  Goal: Plan of Care Review  Outcome: Ongoing (interventions implemented as appropriate)    02/14/17 1028   Coping/Psychosocial Response Interventions   Plan Of Care Reviewed With patient   Patient Care Overview   Progress improving   Outcome Evaluation   Outcome Summary/Follow up Plan patient arrived to floor from pacu, vitals stable, reports minimal pain       Goal: Adult Individualization and Mutuality  Outcome: Ongoing (interventions implemented as appropriate)  Goal: Discharge Needs Assessment  Outcome: Ongoing (interventions implemented as appropriate)    Problem: Perioperative Period (Adult)  Goal: Signs and Symptoms of Listed Potential Problems Will be Absent or Manageable (Perioperative Period)  Outcome: Ongoing (interventions implemented as appropriate)    Problem: Knee Replacement, Total (Adult)  Goal: Signs and Symptoms of Listed Potential Problems Will be Absent or Manageable (Knee Replacement, Total)  Outcome: Ongoing (interventions implemented as appropriate)    Problem: Fall Risk (Adult)  Goal: Identify Related Risk Factors and Signs and Symptoms  Outcome: Outcome(s) achieved Date Met:  02/14/17  Goal: Absence of Falls  Outcome: Ongoing (interventions implemented as appropriate)
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
## 2017-02-14 NOTE — PLAN OF CARE
Problem: Patient Care Overview (Adult)  Goal: Plan of Care Review    02/14/17 1159   Coping/Psychosocial Response Interventions   Plan Of Care Reviewed With patient   Outcome Evaluation   Outcome Summary/Follow up Plan Pt s/p R TKA presents with post op pain, weakness, and impaired R knee ROM limiting mobility. Pt would benefit from skilled PT to address impairments and assist w/return to PLOF.          Problem: Inpatient Physical Therapy  Goal: Transfer Training Goal 1 LTG- PT    02/14/17 1159   Transfer Training PT LTG   Transfer Training PT LTG, Date Established 02/14/17   Transfer Training PT LTG, Time to Achieve 3 days   Transfer Training PT LTG, Activity Type all transfers   Transfer Training PT LTG, [place] Level supervision required   Transfer Training PT LTG, Assist Device walker, rolling       Goal: Gait Training Goal LTG- PT    02/14/17 1159   Gait Training PT LTG   Gait Training Goal PT LTG, Date Established 02/14/17   Gait Training Goal PT LTG, Time to Achieve 3 days   Gait Training Goal PT LTG, [place] Level supervision required   Gait Training Goal PT LTG, Assist Device walker, rolling   Gait Training Goal PT LTG, Distance to Achieve 100       Goal: Stair Training Goal LTG- PT    02/14/17 1159   Stair Training PT LTG   Stair Training Goal PT LTG, Date Established 02/14/17   Stair Training Goal PT LTG, Time to Achieve 3 days   Stair Training Goal PT LTG, Number of Steps 3   Stair Training Goal PT LTG, [place] Level contact guard assist   Stair Training Goal PT LTG, Assist Device 2 handrails       Goal: Range of Motion Goal LTG- PT    02/14/17 1159   Range of Motion PT LTG   Range of Motion Goal PT LTG, Date Established 02/14/17   Range of Motion Goal PT LTG, Time to Achieve 3 days   Range fo Motion Goal PT LTG, Joint R knee   Range of Motion Goal PT LTG, AROM Measure 0-90       Goal: Patient Education Goal LTG- PT    02/14/17 1159   Patient Education PT LTG   Patient Education PT LTG,  Date Established 02/14/17   Patient Education PT LTG, Time to Achieve 3 days   Patient Education PT LTG, Education Type HEP   Patient Education PT LTG, Education Understanding demonstrate adequately;verbalize understanding

## 2017-02-14 NOTE — ANESTHESIA PROCEDURE NOTES
Airway  Urgency: elective    Date/Time: 2/14/2017 7:02 AM  Airway not difficult    General Information and Staff    Patient location during procedure: OR  Anesthesiologist: CRYSTAL LEWIS  CRNA: JESSICA STEVESN    Indications and Patient Condition  Indications for airway management: airway protection    Preoxygenated: yes  MILS not maintained throughout  Mask difficulty assessment: 1 - vent by mask    Final Airway Details  Final airway type: endotracheal airway      Successful airway: ETT  Cuffed: yes   Successful intubation technique: direct laryngoscopy  Endotracheal tube insertion site: oral  Blade: Sabina  Blade size: #3  ETT size: 7.0 mm  Cormack-Lehane Classification: grade I - full view of glottis  Placement verified by: chest auscultation and capnometry   Measured from: lips  ETT to lips (cm): 20  Number of attempts at approach: 1    Additional Comments  Atraumatic.  Dentition unchanged.

## 2017-02-14 NOTE — OP NOTE
DATE OF PROCEDURE:  02/14/2016     PREOPERATIVE DIAGNOSIS:   Severe degenerative arthritis, right knee.     POSTOPERATIVE DIAGNOSIS:   Severe degenerative arthritis, right knee.     PROCEDURE PERFORMED: Total knee arthroplasty, right, Biomet.     SURGEON: Giovany Poole MD    ASSISTANT:  Jono Tanner MD     ANESTHESIA: General.     COMPLICATIONS: None.     DRAIN: Hemovac.     ESTIMATED BLOOD LOSS:  50 mL.     DESCRIPTION OF PROCEDURE: Under satisfactory general anesthetic, [place] catheter was inserted. The right leg was then prepped and draped in the usual manner. Tourniquet inflated to 230 mmHg.     A curved medial incision was made around the patella. A medial parapatellar approach was carried out. The patella was inverted 90°, debrided from 22 to 15 to 16 mm. Three drill holes were made for anchorage of a low-profile 28 mm patella. The lateral facet was excised. The patellar guard was applied, slid into the lateral recess. The knee was flexed. Drill holes were made in the distal femur, proximal tibia. These were washed and irrigated. Straight guide rogelio was inserted with a 5° valgus cutting jig on the distal femur. Then, 9-10 mm of distal femur were excised. The next jig measured a #62 Biomet. Anterior, posterior, and chamfer cuts were made. Trial was excellent.     Proximal tibia was cut over an IM rogelio in a 90° angle with no posterior inclination. The upper 10-12 mm were excised. The osteophytes and excess capsule were debrided. Trial with a #71 baseplate, 10 mm polyethylene tray and a #62 femoral were was excellent at 0° to 120°. Central punch was inserted through the tibial baseplate. All trial components were removed. The posterior capsule and the collaterals were infiltrated with Exparel. Tourniquet was released. Hemostasis obtained with the Bovie.     The knee was washed and irrigated copiously with antibiotic solution. Surfaces dried with a sponge. [place] cement was applied. A #71 baseplate and a #62  femoral were impacted into position. Excess cement was removed. A 10 mm polyethylene tray was locked into place. The knee was extended. A 28 mm patella was cemented in place.     The knee was washed and irrigated again and then filled with tranexamic acid. Meanwhile, the deep fascia and subcutaneous were infiltrated with Exparel. After 4 to 4-1/2 minutes, the knee was washed again. Hemovac drain was inserted. Closure was carried out with the knee in flexion and dressing applied. She was taken to the recovery room in satisfactory condition.       Giovany Poole M.D.  EAE:celsa  D:   02/14/2017 08:14:30  T:   02/14/2017 08:27:20  Job ID:   [id number]  Document ID:   [id number]  cc:

## 2017-02-15 LAB
GLUCOSE BLDC GLUCOMTR-MCNC: 138 MG/DL (ref 70–130)
GLUCOSE BLDC GLUCOMTR-MCNC: 145 MG/DL (ref 70–130)
GLUCOSE BLDC GLUCOMTR-MCNC: 194 MG/DL (ref 70–130)
GLUCOSE BLDC GLUCOMTR-MCNC: 224 MG/DL (ref 70–130)
HCT VFR BLD AUTO: 33.3 % (ref 35.6–45.5)
HGB BLD-MCNC: 10.7 G/DL (ref 11.9–15.5)

## 2017-02-15 PROCEDURE — 82962 GLUCOSE BLOOD TEST: CPT

## 2017-02-15 PROCEDURE — 94799 UNLISTED PULMONARY SVC/PX: CPT

## 2017-02-15 PROCEDURE — 97150 GROUP THERAPEUTIC PROCEDURES: CPT

## 2017-02-15 PROCEDURE — 63710000001 INSULIN ASPART PER 5 UNITS: Performed by: HOSPITALIST

## 2017-02-15 PROCEDURE — 97110 THERAPEUTIC EXERCISES: CPT

## 2017-02-15 PROCEDURE — 85018 HEMOGLOBIN: CPT | Performed by: ORTHOPAEDIC SURGERY

## 2017-02-15 PROCEDURE — 25010000002 ENOXAPARIN PER 10 MG: Performed by: ORTHOPAEDIC SURGERY

## 2017-02-15 PROCEDURE — 63710000001 INSULIN DETEMER PER 5 UNITS: Performed by: ORTHOPAEDIC SURGERY

## 2017-02-15 PROCEDURE — 25010000002 KETOROLAC TROMETHAMINE PER 15 MG: Performed by: ORTHOPAEDIC SURGERY

## 2017-02-15 PROCEDURE — 85014 HEMATOCRIT: CPT | Performed by: ORTHOPAEDIC SURGERY

## 2017-02-15 PROCEDURE — 25010000003 CEFAZOLIN IN DEXTROSE 2-4 GM/100ML-% SOLUTION: Performed by: ORTHOPAEDIC SURGERY

## 2017-02-15 RX ADMIN — Medication 1 TABLET: at 07:59

## 2017-02-15 RX ADMIN — HYDROCODONE BITARTRATE AND ACETAMINOPHEN 1 TABLET: 7.5; 325 TABLET ORAL at 18:03

## 2017-02-15 RX ADMIN — INSULIN DETEMIR 25 UNITS: 100 INJECTION, SOLUTION SUBCUTANEOUS at 21:43

## 2017-02-15 RX ADMIN — INSULIN ASPART 4 UNITS: 100 INJECTION, SOLUTION INTRAVENOUS; SUBCUTANEOUS at 17:57

## 2017-02-15 RX ADMIN — ENOXAPARIN SODIUM 30 MG: 30 INJECTION SUBCUTANEOUS at 10:11

## 2017-02-15 RX ADMIN — METFORMIN HYDROCHLORIDE 500 MG: 500 TABLET ORAL at 17:57

## 2017-02-15 RX ADMIN — HYDROCODONE BITARTRATE AND ACETAMINOPHEN 1 TABLET: 7.5; 325 TABLET ORAL at 10:16

## 2017-02-15 RX ADMIN — CETIRIZINE HYDROCHLORIDE 10 MG: 10 TABLET, FILM COATED ORAL at 08:00

## 2017-02-15 RX ADMIN — KETOROLAC TROMETHAMINE 15 MG: 30 INJECTION, SOLUTION INTRAMUSCULAR at 04:07

## 2017-02-15 RX ADMIN — LEVOTHYROXINE SODIUM 112 MCG: 112 TABLET ORAL at 07:59

## 2017-02-15 RX ADMIN — CEFAZOLIN SODIUM 2 G: 2 INJECTION, SOLUTION INTRAVENOUS at 00:13

## 2017-02-15 RX ADMIN — METOPROLOL SUCCINATE 25 MG: 25 TABLET, FILM COATED, EXTENDED RELEASE ORAL at 08:03

## 2017-02-15 RX ADMIN — ACETAMINOPHEN 1000 MG: 500 TABLET ORAL at 04:16

## 2017-02-15 RX ADMIN — PAROXETINE HYDROCHLORIDE HEMIHYDRATE 30 MG: 30 TABLET, FILM COATED ORAL at 08:00

## 2017-02-15 RX ADMIN — METFORMIN HYDROCHLORIDE 500 MG: 500 TABLET ORAL at 07:59

## 2017-02-15 RX ADMIN — PRAVASTATIN SODIUM 40 MG: 40 TABLET ORAL at 08:00

## 2017-02-15 RX ADMIN — INSULIN ASPART 2 UNITS: 100 INJECTION, SOLUTION INTRAVENOUS; SUBCUTANEOUS at 21:48

## 2017-02-15 RX ADMIN — DOCUSATE SODIUM -SENNOSIDES 2 TABLET: 50; 8.6 TABLET, COATED ORAL at 08:00

## 2017-02-15 NOTE — PROGRESS NOTES
Acute Care - Physical Therapy Treatment Note  [facility]     Patient Name: Carli Blair  : 1945  MRN: [id number]  Today's Date: 2/15/2017  Onset of Illness/Injury or Date of Surgery Date: 17     Referring Physician: Virgilio    Admit Date: 2017    Visit Dx:    ICD-10-CM ICD-9-CM   1. Difficulty walking R26.2 719.7     Patient Active Problem List   Diagnosis   • Abnormal feces   • Diabetes   • Abnormal loss of weight   • Palpitations   • Murmur   • Shortness of breath   • SVT (supraventricular tachycardia)   • Essential hypertension   • Abdominal pain   • Anxiety   • Back ache   • Abdomen enlarged   • CN (constipation)   • Wears partial dentures   • Blues   • Difficulty hearing   • Brash   • Incarcerated umbilical hernia   • Anorexia   • Congested   • Cervical pain   • Night sweat   • Allergic rhinitis, seasonal   • Hypothyroidism   • Unspecified visual disturbance   • OA (osteoarthritis) of knee   • Hypersomnia   • PAF (paroxysmal atrial fibrillation)               Adult Rehabilitation Note       02/15/17 1045          Rehab Assessment/Intervention    Discipline physical therapy assistant  -JAVED      Document Type therapy note (daily note)  -JAVED      Subjective Information agree to therapy  -JAVED      Patient Effort, Rehab Treatment good  -JAVED      Precautions/Limitations fall precautions  -JAVED      Recorded by [JAVED] Av Francois PTA      Pain Assessment    Pain Assessment 0-10  -JAVED      Pain Score 4  -JAVED      Post Pain Score 5  -JAVED      Pain Type Acute pain;Surgical pain  -JAVED      Pain Location Knee  -JAVED      Pain Orientation Right  -JAVED      Pain Intervention(s) Ambulation/increased activity;Repositioned;Rest  -JAVED      Response to Interventions tolerated  -JAVED      Recorded by [JAVED] Av Francois PTA      Cognitive Assessment/Intervention    Current Cognitive/Communication Assessment functional  -JAVED      Orientation Status oriented x 4  -JAVED      Follows Commands/Answers Questions 100% of the time  -JAVED       Personal Safety WNL/WFL  -JAVED      Personal Safety Interventions fall prevention program maintained;gait belt;nonskid shoes/slippers when out of bed  -JAVED      Recorded by [JAVED] Av Francois PTA      ROM (Range of Motion)    General ROM lower extremity range of motion deficits identified  -JAVED      Recorded by [JAVED] Av Francois PTA      General LE Assessment    ROM knee, right: LE ROM deficit  -JAVED      ROM Detail 12-84'  -JAVED      Recorded by [JAVED] Av Francois PTA      Bed Mobility, Assessment/Treatment    Bed Mob, Supine to Sit, [place] not tested  -JAVED      Bed Mob, Sit to Supine, [place] not tested  -JAVED      Recorded by [JAVED] Av Francois PTA      Transfer Assessment/Treatment    Transfers, Sit-Stand [place] supervision required  -JAVED      Transfers, Stand-Sit [place] supervision required  -JAVED      Transfers, Sit-Stand-Sit, Assist Device rolling walker  -JAVED      Transfer, Impairments strength decreased;ROM decreased;pain  -JAVED      Recorded by [JAVED] Av Francois PTA      Gait Assessment/Treatment    Gait, [place] Level supervision required;verbal cues required  -JAVED      Gait, Assistive Device rolling walker  -JAVED      Gait, Distance (Feet) 65  -JAVED      Gait, Gait Deviations forward flexed posture;leon decreased;right:;antalgic;decreased heel strike;step length decreased  -JAVED      Gait, Safety Issues step length decreased;supplemental O2  -JAVED      Gait, Impairments strength decreased;ROM decreased;pain  -JAVED      Recorded by [JAVED] Av Francois PTA      Therapy Exercises    Exercise Protocols total knee  -JAVED      Total Knee Exercises right:;15 reps;completed protocol  -JAVED      Recorded by [JAVED] Av Francois PTA      Positioning and Restraints    Pre-Treatment Position sitting in chair/recliner  -JAVED      Post Treatment Position chair  -JAVED      In Chair reclined;call light within reach;encouraged to call for assist  -JAVED      Recorded by [JAVED] Av Francois PTA        User Key  (r) = Recorded By, (t) =  Taken By, (c) = Cosigned By    Initials Name Effective Dates    JAVED Av Francois, PTA 04/24/15 -                 IP PT Goals       02/14/17 1159          Transfer Training PT LTG    Transfer Training PT LTG, Date Established 02/14/17  -EE      Transfer Training PT LTG, Time to Achieve 3 days  -EE      Transfer Training PT LTG, Activity Type all transfers  -EE      Transfer Training PT LTG, [place] Level supervision required  -EE      Transfer Training PT LTG, Assist Device walker, rolling  -EE      Gait Training PT LTG    Gait Training Goal PT LTG, Date Established 02/14/17  -EE      Gait Training Goal PT LTG, Time to Achieve 3 days  -EE      Gait Training Goal PT LTG, [place] Level supervision required  -EE      Gait Training Goal PT LTG, Assist Device walker, rolling  -EE      Gait Training Goal PT LTG, Distance to Achieve 100  -EE      Stair Training PT LTG    Stair Training Goal PT LTG, Date Established 02/14/17  -EE      Stair Training Goal PT LTG, Time to Achieve 3 days  -EE      Stair Training Goal PT LTG, Number of Steps 3  -EE      Stair Training Goal PT LTG, [place] Level contact guard assist  -EE      Stair Training Goal PT LTG, Assist Device 2 handrails  -EE      Range of Motion PT LTG    Range of Motion Goal PT LTG, Date Established 02/14/17  -EE      Range of Motion Goal PT LTG, Time to Achieve 3 days  -EE      Range fo Motion Goal PT LTG, Joint R knee  -EE      Range of Motion Goal PT LTG, AROM Measure 0-90  -EE      Patient Education PT LTG    Patient Education PT LTG, Date Established 02/14/17  -EE      Patient Education PT LTG, Time to Achieve 3 days  -EE      Patient Education PT LTG, Education Type HEP  -EE      Patient Education PT LTG, Education Understanding demonstrate adequately;verbalize understanding  -EE        User Key  (r) = Recorded By, (t) = Taken By, (c) = Cosigned By    Initials Name Provider Type    LUIS A Casarez, PT Physical Therapist          Physical Therapy  Education     Title: PT OT SLP Therapies (Done)     Topic: Physical Therapy (Done)     Point: Mobility training (Done)    Learning Progress Summary    Learner Readiness Method Response Comment Documented by Status   Patient Acceptance E VU  JAVED 02/15/17 1158 Done    Acceptance E,TB VU,NR  EE 02/14/17 1159 Done               Point: Home exercise program (Done)    Learning Progress Summary    Learner Readiness Method Response Comment Documented by Status   Patient Acceptance E VU  JAVED 02/15/17 1158 Done    Acceptance E,TB VU,NR  EE 02/14/17 1159 Done               Point: Body mechanics (Done)    Learning Progress Summary    Learner Readiness Method Response Comment Documented by Status   Patient Acceptance E VU  JAVED 02/15/17 1158 Done    Acceptance E,TB VU,NR  EE 02/14/17 1159 Done                      User Key     Initials Effective Dates Name Provider Type Discipline     12/01/15 -  Claudia Casarez, PT Physical Therapist PT    JAVED 04/24/15 -  Av Francois, PTA Physical Therapy Assistant PT                    PT Recommendation and Plan  Anticipated Discharge Disposition: home with home health  Planned Therapy Interventions: balance training, bed mobility training, gait training, home exercise program, patient/family education, ROM (Range of Motion), stair training, strengthening, stretching, transfer training  PT Frequency: 2 times/day  Plan of Care Review  Plan Of Care Reviewed With: patient  Outcome Summary/Follow up Plan: Pt with improved upright stability and ambulation endurance this date.  Pt w/ supplemental O2, nursing reports SpO2 has been dropping.  No other concerns at this time.           Outcome Measures       02/15/17 1100 02/14/17 1200       How much help from another person do you currently need...    Turning from your back to your side while in flat bed without using bedrails? 4  -JAVED 4  -EE     Moving from lying on back to sitting on the side of a flat bed without bedrails? 4  -JAVED 4  -EE     Moving to and  from a bed to a chair (including a wheelchair)? 3  -JAVED 3  -EE     Standing up from a chair using your arms (e.g., wheelchair, bedside chair)? 3  -JAVED 3  -EE     Climbing 3-5 steps with a railing? 3  -JAVED 2  -EE     To walk in hospital room? 3  -JAVED 3  -EE     AM-PAC 6 Clicks Score 20  -JAVED 19  -EE     Functional Assessment    Outcome Measure Options AM-PAC 6 Clicks Basic Mobility (PT)  -JAVED AM-PAC 6 Clicks Basic Mobility (PT)  -EE       User Key  (r) = Recorded By, (t) = Taken By, (c) = Cosigned By    Initials Name Provider Type    LUIS A Casarez, PT Physical Therapist    JAVED Francois PTA Physical Therapy Assistant           Time Calculation:         PT Charges       02/15/17 1157          Time Calculation    Start Time 1045  -JAVED      Stop Time 1130  -JAVED      Time Calculation (min) 45 min  -JAVED      PT Received On 02/15/17  -JAVED      PT - Next Appointment 02/15/17  -JAVED        User Key  (r) = Recorded By, (t) = Taken By, (c) = Cosigned By    Initials Name Provider Type    JAVED Francois PTA Physical Therapy Assistant          Therapy Charges for Today     Code Description Service Date Service Provider Modifiers Qty    98538843574 HC PT THER PROC GROUP 2/15/2017 Av Francois PTA GP 1    42125226218 HC PT THER PROC EA 15 MIN 2/15/2017 Av Francois PTA GP 1          PT G-Codes  Outcome Measure Options: AM-PAC 6 Clicks Basic Mobility (PT)    Av Francois PTA  2/15/2017

## 2017-02-15 NOTE — PLAN OF CARE
Problem: Patient Care Overview (Adult)  Goal: Plan of Care Review  Outcome: Ongoing (interventions implemented as appropriate)    02/15/17 1158   Coping/Psychosocial Response Interventions   Plan Of Care Reviewed With patient   Outcome Evaluation   Outcome Summary/Follow up Plan Pt with improved upright stability and ambulation endurance this date. Pt w/ supplemental O2, nursing reports SpO2 has been dropping. No other concerns at this time.

## 2017-02-15 NOTE — PLAN OF CARE
Problem: Patient Care Overview (Adult)  Goal: Plan of Care Review    02/15/17 1158 02/15/17 1549   Coping/Psychosocial Response Interventions   Plan Of Care Reviewed With --  patient   Patient Care Overview   Progress --  improving   Outcome Evaluation   Outcome Summary/Follow up Plan Pt with improved upright stability and ambulation endurance this date. Pt w/ supplemental O2, nursing reports SpO2 has been dropping. No other concerns at this time.  --        Goal: Adult Individualization and Mutuality  Outcome: Ongoing (interventions implemented as appropriate)    Problem: Perioperative Period (Adult)  Goal: Signs and Symptoms of Listed Potential Problems Will be Absent or Manageable (Perioperative Period)  Outcome: Ongoing (interventions implemented as appropriate)    Problem: Knee Replacement, Total (Adult)  Goal: Signs and Symptoms of Listed Potential Problems Will be Absent or Manageable (Knee Replacement, Total)  Outcome: Ongoing (interventions implemented as appropriate)    Problem: Fall Risk (Adult)  Goal: Absence of Falls  Outcome: Ongoing (interventions implemented as appropriate)

## 2017-02-15 NOTE — PROGRESS NOTES
"    DAILY PROGRESS NOTE  Psychiatric    Patient Identification:  Name: Carli Blair  Age: 72 y.o.  Sex: female  :  1945  MRN: [id number]         Primary Care Physician: Stacy Ruiz MD    Subjective:  Interval History: no new issues. No cp/sob/n/v/d    Objective:daughter at bs    Scheduled Meds:  acetaminophen 325 mg Oral 4x Daily   calcium carbonate 1 tablet Oral Daily   cetirizine 10 mg Oral Daily   enoxaparin 30 mg Subcutaneous Daily   insulin aspart 0-9 Units Subcutaneous 4x Daily AC & at Bedtime   insulin detemir 25 Units Subcutaneous Nightly   levothyroxine 112 mcg Oral Daily   metFORMIN 500 mg Oral BID With Meals   metoprolol succinate XL 25 mg Oral QAM   PARoxetine 30 mg Oral Daily   pravastatin 40 mg Oral Daily   sennosides-docusate sodium 2 tablet Oral Daily     Continuous Infusions:  lactated ringers 9 mL/hr Last Rate: 9 mL/hr (17 0647)   lactated ringers 75 mL/hr Last Rate: 75 mL/hr (17 1248)       Vital signs in last 24 hours:  Temp:  [97 °F (36.1 °C)-99.4 °F (37.4 °C)] 98.7 °F (37.1 °C)  Heart Rate:  [58-75] 75  Resp:  [16] 16  BP: (103-143)/(52-74) 136/74    Intake/Output:    Intake/Output Summary (Last 24 hours) at 02/15/17 1309  Last data filed at 02/15/17 0852   Gross per 24 hour   Intake    600 ml   Output   1825 ml   Net  -1225 ml       Exam:  Visit Vitals   • /74   • Pulse 75   • Temp 98.7 °F (37.1 °C) (Oral)   • Resp 16   • Ht 64\" (162.6 cm)   • Wt 163 lb 8 oz (74.2 kg)   • SpO2 100%   • BMI 28.06 kg/m2       General Appearance:    Alert, cooperative, no distress, AAOx3                          Head:    Normocephalic, without obvious abnormality, atraumatic                         Throat:   Lips, tongue, gums normal; oral mucosa pink and moist                           Neck:   Supple, no JVD                         Lungs:    Clear to auscultation bilaterally, respirations unlabored                          Heart:    Regular rate and rhythm, S1 and S2 " normal                  Abdomen:     Soft, non-tender, bowel sounds active                 Extremities:   Ice to right knee, no cyanosis or edema                        Pulses:   Pulses palpable in lower extremities                  Neurologic:   CNII-XII intact, motor strength grossly intact, no focal deficits noted     Data Review:  Labs in chart were reviewed.    Assessment:  Active Hospital Problems (** Indicates Principal Problem)    Diagnosis Date Noted   • OA (osteoarthritis) of knee [M17.9] 06/15/2016      Resolved Hospital Problems    Diagnosis Date Noted Date Resolved   No resolved problems to display.       Plan:  DM2 - controlled - continue same    HTN - ok - a little low this am    PAFib - RC - resume Xarelto in am - just received Lovenox for prophylaxis    Hypothyroid - levothyroxine    Postoperative acute blood loss anemia - Hgb 12.5-10.7-monitor    Lambert Ivan MD  2/15/2017  1:09 PM  ]

## 2017-02-15 NOTE — PLAN OF CARE
Problem: Patient Care Overview (Adult)  Goal: Plan of Care Review  Outcome: Ongoing (interventions implemented as appropriate)    02/15/17 0500   Coping/Psychosocial Response Interventions   Plan Of Care Reviewed With patient   Patient Care Overview   Progress improving   Outcome Evaluation   Outcome Summary/Follow up Plan VSS. Res denies pain to right knee. c/o pain to neck which is baseline per pt. Tylenol administered. Cath care procvided. Pt transfers from bed to chair assist x 1 with walker.        Goal: Adult Individualization and Mutuality  Outcome: Ongoing (interventions implemented as appropriate)  Goal: Discharge Needs Assessment  Outcome: Ongoing (interventions implemented as appropriate)    Problem: Perioperative Period (Adult)  Goal: Signs and Symptoms of Listed Potential Problems Will be Absent or Manageable (Perioperative Period)  Outcome: Ongoing (interventions implemented as appropriate)    Problem: Knee Replacement, Total (Adult)  Goal: Signs and Symptoms of Listed Potential Problems Will be Absent or Manageable (Knee Replacement, Total)  Outcome: Ongoing (interventions implemented as appropriate)    Problem: Fall Risk (Adult)  Goal: Absence of Falls  Outcome: Ongoing (interventions implemented as appropriate)
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
## 2017-02-15 NOTE — PROGRESS NOTES
Acute Care - Physical Therapy Treatment Note  [facility]     Patient Name: Carli Blair  : 1945  MRN: [id number]  Today's Date: 2/15/2017  Onset of Illness/Injury or Date of Surgery Date: 17     Referring Physician: Virgilio    Admit Date: 2017    Visit Dx:    ICD-10-CM ICD-9-CM   1. Difficulty walking R26.2 719.7     Patient Active Problem List   Diagnosis   • Abnormal feces   • Diabetes   • Abnormal loss of weight   • Palpitations   • Murmur   • Shortness of breath   • SVT (supraventricular tachycardia)   • Essential hypertension   • Abdominal pain   • Anxiety   • Back ache   • Abdomen enlarged   • CN (constipation)   • Wears partial dentures   • Blues   • Difficulty hearing   • Brash   • Incarcerated umbilical hernia   • Anorexia   • Congested   • Cervical pain   • Night sweat   • Allergic rhinitis, seasonal   • Hypothyroidism   • Unspecified visual disturbance   • OA (osteoarthritis) of knee   • Hypersomnia   • PAF (paroxysmal atrial fibrillation)               Adult Rehabilitation Note       02/15/17 1504 02/15/17 1045       Rehab Assessment/Intervention    Discipline physical therapy assistant  -JAVED physical therapy assistant  -JAVED     Document Type therapy note (daily note)  -JAVED therapy note (daily note)  -JAVED     Subjective Information agree to therapy  -JAVED agree to therapy  -JAVED     Patient Effort, Rehab Treatment good  -JAVED good  -JAVED     Precautions/Limitations fall precautions  -JAVED fall precautions  -JAVED     Recorded by [JAVED] Av Francois PTA [JAVED] Av Francois PTA     Pain Assessment    Pain Assessment 0-10  -JAVED 0-10  -JAVED     Pain Score 4  -JAVED 4  -JAVED     Post Pain Score  5  -JAVED     Pain Type Acute pain;Surgical pain  -JAVED Acute pain;Surgical pain  -JAVED     Pain Location Knee  -JAVED Knee  -JAVED     Pain Orientation Right  -JAVED Right  -JAVED     Pain Intervention(s) Ambulation/increased activity;Repositioned;Rest  -JAVED Ambulation/increased activity;Repositioned;Rest  -JAVED     Response to Interventions  tolerated  -JAVED tolerated  -JAVED     Recorded by [JAVED] Av Francois PTA [JAVED] Av Francois PTA     Cognitive Assessment/Intervention    Current Cognitive/Communication Assessment functional  -JAVED functional  -JAVED     Orientation Status oriented x 4  -JAVED oriented x 4  -JAVED     Follows Commands/Answers Questions 100% of the time  -JAVED 100% of the time  -JAVED     Personal Safety WNL/WFL  -JAVED WNL/WFL  -JAVED     Personal Safety Interventions fall prevention program maintained;gait belt;nonskid shoes/slippers when out of bed  -JAVED fall prevention program maintained;gait belt;nonskid shoes/slippers when out of bed  -JAVED     Recorded by [JAVED] Av Francois PTA [JAVED] Av Francois PTA     ROM (Range of Motion)    General ROM  lower extremity range of motion deficits identified  -JAVED     Recorded by  [JAVED] Av Francois PTA     General LE Assessment    ROM  knee, right: LE ROM deficit  -JAVED     ROM Detail  12-84'  -JAVED     Recorded by  [JAVED] Av Francois PTA     Bed Mobility, Assessment/Treatment    Bed Mob, Supine to Sit, [place] not tested  -JAVED not tested  -JAVED     Bed Mob, Sit to Supine, [place] not tested  -JAVED not tested  -JAVED     Recorded by [JAVED] Av Francois PTA [JAVED] Av Francois PTA     Transfer Assessment/Treatment    Transfers, Sit-Stand [place] supervision required  -JAVED supervision required  -JAVED     Transfers, Stand-Sit [place] supervision required  -JAVED supervision required  -JAVED     Transfers, Sit-Stand-Sit, Assist Device rolling walker  -JAVED rolling walker  -JAVED     Transfer, Impairments strength decreased;ROM decreased;pain  -JAVED strength decreased;ROM decreased;pain  -JAVED     Recorded by [JAVED] Av Francois PTA [JAVED] Av Francois PTA     Gait Assessment/Treatment    Gait, [place] Level supervision required;verbal cues required  -JAVED supervision required;verbal cues required  -JAVED     Gait, Assistive Device rolling walker  -JAVED rolling walker  -JAVED     Gait, Distance (Feet) 100  -JAVED 65  -JAVED     Gait, Gait Deviations forward  flexed posture;leon decreased;right:;antalgic;decreased heel strike;step length decreased  -JAVED forward flexed posture;leon decreased;right:;antalgic;decreased heel strike;step length decreased  -JAVED     Gait, Safety Issues step length decreased;supplemental O2  -JAVED step length decreased;supplemental O2  -JAVED     Gait, Impairments strength decreased;ROM decreased;pain  -JAVED strength decreased;ROM decreased;pain  -JAVED     Recorded by [JAVED] Av Francois PTA [JAVED] Av Francois PTA     Therapy Exercises    Exercise Protocols total knee  -JAVED total knee  -JAVED     Total Knee Exercises right:;20 reps;completed protocol  -JAVED right:;15 reps;completed protocol  -JAVED     Recorded by [JAVED] Av Francois PTA [JAVED] Av Francois PTA     Positioning and Restraints    Pre-Treatment Position sitting in chair/recliner  -JAVED sitting in chair/recliner  -JAVED     Post Treatment Position chair  -JAVED chair  -JAVED     In Chair reclined;call light within reach;encouraged to call for assist  -JAVED reclined;call light within reach;encouraged to call for assist  -AJVED     Recorded by [JAVED] Av Francois PTA [JAVED] Av Francois PTA       User Key  (r) = Recorded By, (t) = Taken By, (c) = Cosigned By    Initials Name Effective Dates    JAVED Francois PTA 04/24/15 -                 IP PT Goals       02/14/17 1159          Transfer Training PT LTG    Transfer Training PT LTG, Date Established 02/14/17  -EE      Transfer Training PT LTG, Time to Achieve 3 days  -EE      Transfer Training PT LTG, Activity Type all transfers  -EE      Transfer Training PT LTG, [place] Level supervision required  -EE      Transfer Training PT LTG, Assist Device walker, rolling  -EE      Gait Training PT LTG    Gait Training Goal PT LTG, Date Established 02/14/17  -EE      Gait Training Goal PT LTG, Time to Achieve 3 days  -EE      Gait Training Goal PT LTG, [place] Level supervision required  -EE      Gait Training Goal PT LTG, Assist Device walker, rolling  -EE      Gait  Training Goal PT LTG, Distance to Achieve 100  -EE      Stair Training PT LTG    Stair Training Goal PT LTG, Date Established 02/14/17  -EE      Stair Training Goal PT LTG, Time to Achieve 3 days  -EE      Stair Training Goal PT LTG, Number of Steps 3  -EE      Stair Training Goal PT LTG, [place] Level contact guard assist  -EE      Stair Training Goal PT LTG, Assist Device 2 handrails  -EE      Range of Motion PT LTG    Range of Motion Goal PT LTG, Date Established 02/14/17  -EE      Range of Motion Goal PT LTG, Time to Achieve 3 days  -EE      Range fo Motion Goal PT LTG, Joint R knee  -EE      Range of Motion Goal PT LTG, AROM Measure 0-90  -EE      Patient Education PT LTG    Patient Education PT LTG, Date Established 02/14/17  -EE      Patient Education PT LTG, Time to Achieve 3 days  -EE      Patient Education PT LTG, Education Type HEP  -EE      Patient Education PT LTG, Education Understanding demonstrate adequately;verbalize understanding  -EE        User Key  (r) = Recorded By, (t) = Taken By, (c) = Cosigned By    Initials Name Provider Type    EE Claudia Casarez, PT Physical Therapist          Physical Therapy Education     Title: PT OT SLP Therapies (Done)     Topic: Physical Therapy (Done)     Point: Mobility training (Done)    Learning Progress Summary    Learner Readiness Method Response Comment Documented by Status   Patient Acceptance E VU  JAVED 02/15/17 1604 Done    Acceptance E VU  JAVED 02/15/17 1158 Done    Acceptance E,TB VU,NR  EE 02/14/17 1159 Done               Point: Home exercise program (Done)    Learning Progress Summary    Learner Readiness Method Response Comment Documented by Status   Patient Acceptance E VU  JAVED 02/15/17 1604 Done    Acceptance E VU  JAVED 02/15/17 1158 Done    Acceptance E,TB VU,NR  EE 02/14/17 1159 Done               Point: Body mechanics (Done)    Learning Progress Summary    Learner Readiness Method Response Comment Documented by Status   Patient Acceptance E ANTONIA BURT  02/15/17 1604 Done    Acceptance E VU  JAVED 02/15/17 1158 Done    Acceptance E,TB VU,NR  EE 02/14/17 1159 Done                      User Key     Initials Effective Dates Name Provider Type Discipline    EE 12/01/15 -  Claudia Casarez, PT Physical Therapist PT    JAVED 04/24/15 -  Av Francois PTA Physical Therapy Assistant PT                    PT Recommendation and Plan  Anticipated Discharge Disposition: home with home health  Planned Therapy Interventions: balance training, bed mobility training, gait training, home exercise program, patient/family education, ROM (Range of Motion), stair training, strengthening, stretching, transfer training  PT Frequency: 2 times/day  Plan of Care Review  Plan Of Care Reviewed With: patient  Outcome Summary/Follow up Plan: Pt with improved upright stability and ambulation endurance this date.  Pt w/ supplemental O2, nursing reports SpO2 has been dropping.  No other concerns at this time.           Outcome Measures       02/15/17 1100 02/14/17 1200       How much help from another person do you currently need...    Turning from your back to your side while in flat bed without using bedrails? 4  -JAVED 4  -EE     Moving from lying on back to sitting on the side of a flat bed without bedrails? 4  -JAVED 4  -EE     Moving to and from a bed to a chair (including a wheelchair)? 3  -JAVED 3  -EE     Standing up from a chair using your arms (e.g., wheelchair, bedside chair)? 3  -JAVED 3  -EE     Climbing 3-5 steps with a railing? 3  -JAVED 2  -EE     To walk in hospital room? 3  -JAVED 3  -EE     AM-PAC 6 Clicks Score 20  -JAVED 19  -EE     Functional Assessment    Outcome Measure Options AM-PAC 6 Clicks Basic Mobility (PT)  -JAVED AM-PAC 6 Clicks Basic Mobility (PT)  -EE       User Key  (r) = Recorded By, (t) = Taken By, (c) = Cosigned By    Initials Name Provider Type    LUIS A Casarez, SARA Physical Therapist    JAVED Francois PTA Physical Therapy Assistant           Time Calculation:         PT Charges        02/15/17 1604 02/15/17 1157       Time Calculation    Start Time 1504  -JAVED 1045  -JAVED     Stop Time 1555  -JAVED 1130  -JAVED     Time Calculation (min) 51 min  -JAVED 45 min  -JAVED     PT Received On 02/15/17  -JAVED 02/15/17  -JAVED     PT - Next Appointment 02/16/17  -JAVED 02/15/17  -JAVED       User Key  (r) = Recorded By, (t) = Taken By, (c) = Cosigned By    Initials Name Provider Type    JAVED Francois PTA Physical Therapy Assistant          Therapy Charges for Today     Code Description Service Date Service Provider Modifiers Qty    78224172931 HC PT THER PROC GROUP 2/15/2017 Av Francois PTA GP 1    51434006886 HC PT THER PROC EA 15 MIN 2/15/2017 Av Francois PTA GP 1    63088926667 HC PT THER PROC GROUP 2/15/2017 Av Francois PTA GP 1    38868744544 HC PT THER PROC EA 15 MIN 2/15/2017 Av Francois PTA GP 1          PT G-Codes  Outcome Measure Options: AM-PAC 6 Clicks Basic Mobility (PT)    Av Francois PTA  2/15/2017

## 2017-02-15 NOTE — PLAN OF CARE
Problem: Patient Care Overview (Adult)  Goal: Plan of Care Review  Outcome: Ongoing (interventions implemented as appropriate)  Goal: Adult Individualization and Mutuality  Outcome: Ongoing (interventions implemented as appropriate)  Goal: Discharge Needs Assessment  Outcome: Ongoing (interventions implemented as appropriate)    Problem: Perioperative Period (Adult)  Goal: Signs and Symptoms of Listed Potential Problems Will be Absent or Manageable (Perioperative Period)  Outcome: Ongoing (interventions implemented as appropriate)    Problem: Knee Replacement, Total (Adult)  Goal: Signs and Symptoms of Listed Potential Problems Will be Absent or Manageable (Knee Replacement, Total)  Outcome: Ongoing (interventions implemented as appropriate)    Problem: Fall Risk (Adult)  Goal: Absence of Falls  Outcome: Ongoing (interventions implemented as appropriate)

## 2017-02-15 NOTE — PROGRESS NOTES
Orthopedic Progress Note        Patient: Carli Blair    Date of Admission: 2/14/2017  5:39 AM    YOB: 1945    Medical Record Number: [id number]    Attending Physician: Giovany Poole MD      POD # 1     Status post- TN TOTAL KNEE ARTHROPLASTY [13922] (RT TOTAL KNEE ARTHROPLASTY)      Systemic or Specific Complaints: No Complaints      Allergies:   Allergies   Allergen Reactions   • Iodine Hives       Medications:   Current Medications:  Scheduled Meds:  acetaminophen 325 mg Oral 4x Daily   calcium carbonate 1 tablet Oral Daily   cetirizine 10 mg Oral Daily   enoxaparin 30 mg Subcutaneous Daily   insulin aspart 0-9 Units Subcutaneous 4x Daily AC & at Bedtime   insulin detemir 25 Units Subcutaneous Nightly   levothyroxine 112 mcg Oral Daily   metFORMIN 500 mg Oral BID With Meals   metoprolol succinate XL 25 mg Oral QAM   PARoxetine 30 mg Oral Daily   pravastatin 40 mg Oral Daily   sennosides-docusate sodium 2 tablet Oral Daily     Continuous Infusions:  lactated ringers 9 mL/hr Last Rate: 9 mL/hr (02/14/17 0647)   lactated ringers 75 mL/hr Last Rate: 75 mL/hr (02/14/17 1248)     PRN Meds:.•  acetaminophen  •  dextrose  •  dextrose  •  diphenhydrAMINE  •  docusate sodium  •  docusate sodium  •  glucagon (human recombinant)  •  HYDROcodone-acetaminophen  •  HYDROmorphone **AND** naloxone  •  magnesium hydroxide  •  ondansetron **OR** ondansetron ODT **OR** ondansetron  •  sodium chloride      Physical Exam: 72 y.o. female  General Appearance:    alert and oriented                Pain Relief: Patient reports complete resolution   Vitals:    02/14/17 1536 02/14/17 2011 02/14/17 2332 02/15/17 0443   BP: 103/52 119/70 107/53 110/62   BP Location: Right arm Right arm Right arm Right arm   Patient Position: Sitting Sitting Lying Lying   Pulse: 62 60 58 74   Resp: 16 16 16 16   Temp: 97 °F (36.1 °C) 97.3 °F (36.3 °C) 98.1 °F (36.7 °C) 98.4 °F (36.9 °C)   TempSrc: Oral Oral Oral Oral   SpO2: 100% 100% 100%  99%   Weight:       Height:                  Abdomen:     Normal bowel sounds, no masses, no organomegaly, soft        non-tender, non-distended, no guarding, no rebound                 tenderness       Extremities:   Operative extremity neurovascular status intact. ROM intact.    Incision intact w/out signs or  symptoms of infection. No           edema, no cyanosis, no calf tenderness. rom improving         Skin:     Skin Warm/Dry w/out ulceration, ecchymosis, rash, or   cyanosis     Activity: Mobilizing Per P.T.   Weight Bearing: As Tolerated    Diagnostic Tests:   Lab Results (last 24 hours)     Procedure Component Value Units Date/Time    POC Glucose Fingerstick [[id number]]  (Abnormal) Collected:  02/14/17 0601    Specimen:  Blood Updated:  02/14/17 0602     Glucose 159 (H) mg/dL     Narrative:       Meter: [id number] : [id number] Pilar MALDONADO    POC Glucose Fingerstick [[id number]]  (Abnormal) Collected:  02/14/17 0848    Specimen:  Blood Updated:  02/14/17 0849     Glucose 157 (H) mg/dL     Narrative:       Meter: [id number] : [id number] Andrew Roldan    POC Glucose Fingerstick [[id number]]  (Abnormal) Collected:  02/14/17 1609    Specimen:  Blood Updated:  02/14/17 1610     Glucose 232 (H) mg/dL     Narrative:       Meter: [id number] : [id number] Cuco Gonzalez    POC Glucose Fingerstick [[id number]]  (Abnormal) Collected:  02/14/17 2132    Specimen:  Blood Updated:  02/14/17 2133     Glucose 307 (H) mg/dL     Narrative:       Meter: [id number] : [id number] Gary Mann    Hemoglobin & Hematocrit, Blood [[id number]]  (Abnormal) Collected:  02/15/17 0435    Specimen:  Blood Updated:  02/15/17 0505     Hemoglobin 10.7 (L) g/dL      Hematocrit 33.3 (L) %              Imaging Results (last 24 hours)     ** No results found for the last 24 hours. **           Assessment:    NH TOTAL KNEE ARTHROPLASTY [54952] (RT TOTAL KNEE ARTHROPLASTY)    Post-operative Pain  Immobility    Plan:    Continue  efforts to mobilize  Continue Pain Control Measures  Continue incisional Care  HG 10.7  PAIN VERY LITTLE PAIN MED CAUSES NAUSEA   SOLER AN IRRITATION   DC SOLER AND DRAIN THIS AM  ALSO IV  DC HOME IN AM    Discharge Plan:HOME THURSDAY     Date: 2/15/2017  Time: 5:41 AM    Giovany Poole MD

## 2017-02-16 VITALS
TEMPERATURE: 98 F | WEIGHT: 163.5 LBS | SYSTOLIC BLOOD PRESSURE: 132 MMHG | HEART RATE: 86 BPM | DIASTOLIC BLOOD PRESSURE: 63 MMHG | HEIGHT: 64 IN | BODY MASS INDEX: 27.91 KG/M2 | OXYGEN SATURATION: 93 % | RESPIRATION RATE: 16 BRPM

## 2017-02-16 LAB
DEPRECATED RDW RBC AUTO: 44.8 FL (ref 37–54)
ERYTHROCYTE [DISTWIDTH] IN BLOOD BY AUTOMATED COUNT: 13.6 % (ref 11.7–13)
GLUCOSE BLDC GLUCOMTR-MCNC: 137 MG/DL (ref 70–130)
GLUCOSE BLDC GLUCOMTR-MCNC: 171 MG/DL (ref 70–130)
GLUCOSE BLDC GLUCOMTR-MCNC: 179 MG/DL (ref 70–130)
HCT VFR BLD AUTO: 29.5 % (ref 35.6–45.5)
HGB BLD-MCNC: 9.5 G/DL (ref 11.9–15.5)
MCH RBC QN AUTO: 28.9 PG (ref 26.9–32)
MCHC RBC AUTO-ENTMCNC: 32.2 G/DL (ref 32.4–36.3)
MCV RBC AUTO: 89.7 FL (ref 80.5–98.2)
PLATELET # BLD AUTO: 208 10*3/MM3 (ref 140–500)
PMV BLD AUTO: 10.2 FL (ref 6–12)
RBC # BLD AUTO: 3.29 10*6/MM3 (ref 3.9–5.2)
WBC NRBC COR # BLD: 6.68 10*3/MM3 (ref 4.5–10.7)

## 2017-02-16 PROCEDURE — 97150 GROUP THERAPEUTIC PROCEDURES: CPT

## 2017-02-16 PROCEDURE — 82962 GLUCOSE BLOOD TEST: CPT

## 2017-02-16 PROCEDURE — 63710000001 INSULIN ASPART PER 5 UNITS: Performed by: HOSPITALIST

## 2017-02-16 PROCEDURE — 85027 COMPLETE CBC AUTOMATED: CPT | Performed by: HOSPITALIST

## 2017-02-16 PROCEDURE — 97110 THERAPEUTIC EXERCISES: CPT

## 2017-02-16 RX ORDER — HYDROCODONE BITARTRATE AND ACETAMINOPHEN 7.5; 325 MG/1; MG/1
1 TABLET ORAL EVERY 4 HOURS PRN
Qty: 80 TABLET | Refills: 0 | Status: SHIPPED | OUTPATIENT
Start: 2017-02-16 | End: 2017-02-24

## 2017-02-16 RX ORDER — NICOTINE POLACRILEX 4 MG
LOZENGE BUCCAL
Qty: 15 EACH | Refills: 1
Start: 2017-02-16 | End: 2017-06-07

## 2017-02-16 RX ADMIN — Medication 1 TABLET: at 08:23

## 2017-02-16 RX ADMIN — RIVAROXABAN 20 MG: 20 TABLET, FILM COATED ORAL at 16:59

## 2017-02-16 RX ADMIN — INSULIN ASPART 2 UNITS: 100 INJECTION, SOLUTION INTRAVENOUS; SUBCUTANEOUS at 13:17

## 2017-02-16 RX ADMIN — PRAVASTATIN SODIUM 40 MG: 40 TABLET ORAL at 08:23

## 2017-02-16 RX ADMIN — DOCUSATE SODIUM -SENNOSIDES 2 TABLET: 50; 8.6 TABLET, COATED ORAL at 08:23

## 2017-02-16 RX ADMIN — INSULIN ASPART 2 UNITS: 100 INJECTION, SOLUTION INTRAVENOUS; SUBCUTANEOUS at 16:59

## 2017-02-16 RX ADMIN — METFORMIN HYDROCHLORIDE 500 MG: 500 TABLET ORAL at 08:23

## 2017-02-16 RX ADMIN — HYDROCODONE BITARTRATE AND ACETAMINOPHEN 1 TABLET: 7.5; 325 TABLET ORAL at 15:02

## 2017-02-16 RX ADMIN — PAROXETINE HYDROCHLORIDE HEMIHYDRATE 30 MG: 30 TABLET, FILM COATED ORAL at 08:23

## 2017-02-16 RX ADMIN — LEVOTHYROXINE SODIUM 112 MCG: 112 TABLET ORAL at 08:23

## 2017-02-16 RX ADMIN — HYDROCODONE BITARTRATE AND ACETAMINOPHEN 1 TABLET: 7.5; 325 TABLET ORAL at 18:38

## 2017-02-16 RX ADMIN — HYDROCODONE BITARTRATE AND ACETAMINOPHEN 1 TABLET: 7.5; 325 TABLET ORAL at 10:08

## 2017-02-16 RX ADMIN — HYDROCODONE BITARTRATE AND ACETAMINOPHEN 1 TABLET: 7.5; 325 TABLET ORAL at 02:44

## 2017-02-16 RX ADMIN — METFORMIN HYDROCHLORIDE 500 MG: 500 TABLET ORAL at 16:59

## 2017-02-16 RX ADMIN — CETIRIZINE HYDROCHLORIDE 10 MG: 10 TABLET, FILM COATED ORAL at 08:23

## 2017-02-16 NOTE — PLAN OF CARE
Problem: Patient Care Overview (Adult)  Goal: Plan of Care Review  Outcome: Ongoing (interventions implemented as appropriate)    02/16/17 1306 02/16/17 1446   Coping/Psychosocial Response Interventions   Plan Of Care Reviewed With --  patient   Patient Care Overview   Progress --  improving   Outcome Evaluation   Outcome Summary/Follow up Plan Pt with continued progression of functional mobility as expected, trained for proper stair climbing. No new concerns.  --        Goal: Adult Individualization and Mutuality  Outcome: Ongoing (interventions implemented as appropriate)    Problem: Knee Replacement, Total (Adult)  Goal: Signs and Symptoms of Listed Potential Problems Will be Absent or Manageable (Knee Replacement, Total)  Outcome: Ongoing (interventions implemented as appropriate)    Problem: Fall Risk (Adult)  Goal: Absence of Falls  Outcome: Ongoing (interventions implemented as appropriate)

## 2017-02-16 NOTE — NURSING NOTE
Res had one episode of increased HR of 170. Pt states that this has happened before at home. C/o nausea and some discomfort in chest  noted. HR is back to normal at 80.  Res requested piece of toast to eat before taking pain medication for pain in right knee. Dr. Prasad was notified of  Increased HR. No new orders at this time.

## 2017-02-16 NOTE — PROGRESS NOTES
Acute Care - Physical Therapy Treatment Note  [facility]     Patient Name: Carli Blair  : 1945  MRN: [id number]  Today's Date: 2017  Onset of Illness/Injury or Date of Surgery Date: 17     Referring Physician: Virgilio    Admit Date: 2017    Visit Dx:    ICD-10-CM ICD-9-CM   1. Difficulty walking R26.2 719.7     Patient Active Problem List   Diagnosis   • Abnormal feces   • Diabetes   • Abnormal loss of weight   • Palpitations   • Murmur   • Shortness of breath   • SVT (supraventricular tachycardia)   • Essential hypertension   • Abdominal pain   • Anxiety   • Back ache   • Abdomen enlarged   • CN (constipation)   • Wears partial dentures   • Blues   • Difficulty hearing   • Brash   • Incarcerated umbilical hernia   • Anorexia   • Congested   • Cervical pain   • Night sweat   • Allergic rhinitis, seasonal   • Hypothyroidism   • Unspecified visual disturbance   • OA (osteoarthritis) of knee   • Hypersomnia   • PAF (paroxysmal atrial fibrillation)               Adult Rehabilitation Note       17 1030 02/15/17 1504 02/15/17 1045    Rehab Assessment/Intervention    Discipline physical therapy assistant  -JAVED physical therapy assistant  -JAVED physical therapy assistant  -JAVED    Document Type therapy note (daily note)  -JAVED therapy note (daily note)  -JAVED therapy note (daily note)  -JAVED    Subjective Information agree to therapy  -JAVED agree to therapy  -JAVED agree to therapy  -JAVED    Patient Effort, Rehab Treatment good  -JAVED good  -JAVED good  -JAVED    Precautions/Limitations fall precautions  -JAVED fall precautions  -JAVED fall precautions  -JAVED    Recorded by [JAVED] Av Francois PTA [JAVED] Av Francois PTA [JAVED] Av Francois PTA    Pain Assessment    Pain Assessment 0-10  -JAVED 0-10  -JAVED 0-10  -JAVED    Pain Score 5  -JAVED 4  -JAVED 4  -JAVED    Post Pain Score   5  -JAVED    Pain Type Acute pain;Surgical pain  -JAVED Acute pain;Surgical pain  -JAVED Acute pain;Surgical pain  -JAVED    Pain Location Knee  -JAVED Knee  -JAVED Knee  -JAVED    Pain  Orientation Right  -JAVED Right  -JAVED Right  -JAVED    Pain Intervention(s) Ambulation/increased activity;Repositioned;Rest  -JAVED Ambulation/increased activity;Repositioned;Rest  -JAVED Ambulation/increased activity;Repositioned;Rest  -JAVED    Response to Interventions tolerated  -JAVED tolerated  -JAVED tolerated  -JAVED    Recorded by [JAVED] Av Francois PTA [JAVED] Av Francois PTA [JAVED] Av Francois PTA    Cognitive Assessment/Intervention    Current Cognitive/Communication Assessment functional  -JAVED functional  -JAVED functional  -JAVED    Orientation Status oriented x 4  -JAVED oriented x 4  -JAVED oriented x 4  -JAVED    Follows Commands/Answers Questions 100% of the time  -JAVED 100% of the time  -JAVED 100% of the time  -JAVED    Personal Safety WNL/WFL  -JAVED WNL/WFL  -JAVED WNL/WFL  -JAVED    Personal Safety Interventions fall prevention program maintained;gait belt;nonskid shoes/slippers when out of bed  -JAVED fall prevention program maintained;gait belt;nonskid shoes/slippers when out of bed  -JAVED fall prevention program maintained;gait belt;nonskid shoes/slippers when out of bed  -JAVED    Recorded by [JAVED] Av Francois PTA [JAVED] Av Francois PTA [JAVED] Av Francois PTA    ROM (Range of Motion)    General ROM lower extremity range of motion deficits identified  -JAVED  lower extremity range of motion deficits identified  -JAVED    Recorded by [JAVED] Av Francois PTA  [JAVED] Av Francois PTA    General LE Assessment    ROM knee, right: LE ROM deficit  -JAVED  knee, right: LE ROM deficit  -JAVED    ROM Detail 21-80'  -JAVED  12-84'  -JAVED    Recorded by [JAVED] Av Francois PTA  [JAVED] Av Francois PTA    Bed Mobility, Assessment/Treatment    Bed Mob, Supine to Sit, [place] not tested  -JAVED not tested  -JAVED not tested  -JAVED    Bed Mob, Sit to Supine, [place] not tested  -JAVED not tested  -JAVED not tested  -JAVED    Recorded by [JAVED] Av Francois PTA [JAVED] Av Francois PTA [JAVED] Av Francois PTA    Transfer Assessment/Treatment    Transfers, Sit-Stand [place] supervision required  -JAVED supervision  required  -JAVED supervision required  -JAVED    Transfers, Stand-Sit [place] supervision required  -JAVED supervision required  -JAVED supervision required  -JAVED    Transfers, Sit-Stand-Sit, Assist Device rolling walker  -JAVED rolling walker  -JAVED rolling walker  -JAVED    Transfer, Impairments strength decreased;ROM decreased;pain  -JAVED strength decreased;ROM decreased;pain  -JAVED strength decreased;ROM decreased;pain  -JAVED    Recorded by [JAVED] vA Francois PTA [JAVED] Av Francois PTA [JAVED] Av Francois PTA    Gait Assessment/Treatment    Gait, [place] Level supervision required;verbal cues required  -JAVED supervision required;verbal cues required  -JAVED supervision required;verbal cues required  -JAVED    Gait, Assistive Device rolling walker  -JAVED rolling walker  -JAVED rolling walker  -JAVED    Gait, Distance (Feet) 115  -JAVED 100  -JAVED 65  -JAVED    Gait, Gait Deviations forward flexed posture;leon decreased;right:;antalgic;decreased heel strike;step length decreased  -JAVED forward flexed posture;leon decreased;right:;antalgic;decreased heel strike;step length decreased  -JAVED forward flexed posture;leon decreased;right:;antalgic;decreased heel strike;step length decreased  -JAVED    Gait, Safety Issues step length decreased;supplemental O2  -JAVED step length decreased;supplemental O2  -JAVED step length decreased;supplemental O2  -JAVED    Gait, Impairments strength decreased;ROM decreased;pain  -JAVED strength decreased;ROM decreased;pain  -JAVED strength decreased;ROM decreased;pain  -JAVED    Recorded by [JAVED] Av Francois PTA [JAVED] Av Francois PTA [JAVED] Av Francois PTA    Stairs Assessment/Treatment    Number of Stairs 4  -JAVED      Stairs, Handrail Location both sides  -JAVED      Stairs, [place] Level contact guard assist;supervision required  -JAVED      Stairs, Technique Used step to step (ascending);step to step (descending)  -JAVED      Stairs, Impairments strength decreased;pain  -JAVED      Recorded by [JAVED] Av Francois PTA      Therapy Exercises    Exercise  Protocols total knee  -JAVED total knee  -JAVED total knee  -JAVED    Total Knee Exercises right:;25 reps;completed protocol  -JAVED right:;20 reps;completed protocol  -JAVED right:;15 reps;completed protocol  -JAVED    Recorded by [JAVED] Av Francois PTA [JAVED] Av Francois PTA [JAVED] Av Francois PTA    Positioning and Restraints    Pre-Treatment Position sitting in chair/recliner  -JAVED sitting in chair/recliner  -JAVED sitting in chair/recliner  -JAVED    Post Treatment Position chair  -JAVED chair  -JAVED chair  -JAVED    In Chair reclined;call light within reach;encouraged to call for assist  -JAVED reclined;call light within reach;encouraged to call for assist  -JAVED reclined;call light within reach;encouraged to call for assist  -JAVED    Recorded by [JAVED] Av Francois PTA [JAVED] Av Francois PTA [JAVED] Av Francois PTA      User Key  (r) = Recorded By, (t) = Taken By, (c) = Cosigned By    Initials Name Effective Dates    JAVED Francois PTA 04/24/15 -                 IP PT Goals       02/14/17 1159          Transfer Training PT LTG    Transfer Training PT LTG, Date Established 02/14/17  -EE      Transfer Training PT LTG, Time to Achieve 3 days  -EE      Transfer Training PT LTG, Activity Type all transfers  -EE      Transfer Training PT LTG, [place] Level supervision required  -EE      Transfer Training PT LTG, Assist Device walker, rolling  -EE      Gait Training PT LTG    Gait Training Goal PT LTG, Date Established 02/14/17  -EE      Gait Training Goal PT LTG, Time to Achieve 3 days  -EE      Gait Training Goal PT LTG, [place] Level supervision required  -EE      Gait Training Goal PT LTG, Assist Device walker, rolling  -EE      Gait Training Goal PT LTG, Distance to Achieve 100  -EE      Stair Training PT LTG    Stair Training Goal PT LTG, Date Established 02/14/17  -EE      Stair Training Goal PT LTG, Time to Achieve 3 days  -EE      Stair Training Goal PT LTG, Number of Steps 3  -EE      Stair Training Goal PT LTG, [place] Level contact  guard assist  -EE      Stair Training Goal PT LTG, Assist Device 2 handrails  -EE      Range of Motion PT LTG    Range of Motion Goal PT LTG, Date Established 02/14/17  -EE      Range of Motion Goal PT LTG, Time to Achieve 3 days  -EE      Range fo Motion Goal PT LTG, Joint R knee  -EE      Range of Motion Goal PT LTG, AROM Measure 0-90  -EE      Patient Education PT LTG    Patient Education PT LTG, Date Established 02/14/17  -EE      Patient Education PT LTG, Time to Achieve 3 days  -EE      Patient Education PT LTG, Education Type HEP  -EE      Patient Education PT LTG, Education Understanding demonstrate adequately;verbalize understanding  -EE        User Key  (r) = Recorded By, (t) = Taken By, (c) = Cosigned By    Initials Name Provider Type    EE Claudia Casarez, PT Physical Therapist          Physical Therapy Education     Title: PT OT SLP Therapies (Done)     Topic: Physical Therapy (Done)     Point: Mobility training (Done)    Learning Progress Summary    Learner Readiness Method Response Comment Documented by Status   Patient Acceptance E VU  JAVED 02/16/17 1305 Done    Acceptance E VU  JAVED 02/15/17 1604 Done    Acceptance E VU  JAVED 02/15/17 1158 Done    Acceptance E,TB VU,NR  EE 02/14/17 1159 Done               Point: Home exercise program (Done)    Learning Progress Summary    Learner Readiness Method Response Comment Documented by Status   Patient Acceptance E VU  JAVED 02/16/17 1305 Done    Acceptance E VU  JAVED 02/15/17 1604 Done    Acceptance E VU  JAVED 02/15/17 1158 Done    Acceptance E,TB VU,NR  EE 02/14/17 1159 Done               Point: Body mechanics (Done)    Learning Progress Summary    Learner Readiness Method Response Comment Documented by Status   Patient Acceptance E VU  JAVED 02/16/17 1305 Done    Acceptance E VU  JAVED 02/15/17 1604 Done    Acceptance E VU  JAVED 02/15/17 1158 Done    Acceptance E,TB VU,NR  EE 02/14/17 1159 Done                      User Key     Initials Effective Dates Name Provider Type  Discipline    EE 12/01/15 -  Claudia Casarez, PT Physical Therapist PT    JAVED 04/24/15 -  Av Francois PTA Physical Therapy Assistant PT                    PT Recommendation and Plan  Anticipated Discharge Disposition: home with home health  Planned Therapy Interventions: balance training, bed mobility training, gait training, home exercise program, patient/family education, ROM (Range of Motion), stair training, strengthening, stretching, transfer training  PT Frequency: 2 times/day  Plan of Care Review  Plan Of Care Reviewed With: patient  Outcome Summary/Follow up Plan: Pt with continued progression of functional mobility as expected, trained for proper stair climbing.  No new concerns.           Outcome Measures       02/16/17 1300 02/15/17 1100 02/14/17 1200    How much help from another person do you currently need...    Turning from your back to your side while in flat bed without using bedrails? 4  -JAVED 4  -JAVED 4  -EE    Moving from lying on back to sitting on the side of a flat bed without bedrails? 4  -JAVED 4  -JAVED 4  -EE    Moving to and from a bed to a chair (including a wheelchair)? 3  -JAVED 3  -JAVED 3  -EE    Standing up from a chair using your arms (e.g., wheelchair, bedside chair)? 3  -JAVED 3  -JAVED 3  -EE    Climbing 3-5 steps with a railing? 3  -JAVED 3  -JAVED 2  -EE    To walk in hospital room? 3  -JAVED 3  -JAVED 3  -EE    AM-PAC 6 Clicks Score 20  -JAVED 20  -JAVED 19  -EE    Functional Assessment    Outcome Measure Options AM-PAC 6 Clicks Basic Mobility (PT)  -JAVED AM-PAC 6 Clicks Basic Mobility (PT)  -JAVED AM-PAC 6 Clicks Basic Mobility (PT)  -EE      User Key  (r) = Recorded By, (t) = Taken By, (c) = Cosigned By    Initials Name Provider Type    EE Claudia Casarez, PT Physical Therapist    JAVED Francois PTA Physical Therapy Assistant           Time Calculation:         PT Charges       02/16/17 1304          Time Calculation    Start Time 1030  -JAVED      Stop Time 1119  -JAVED      Time Calculation (min) 49 min  -JAVED      PT Received On  02/16/17  -JAVED      PT - Next Appointment 02/16/17  -JAVED        User Key  (r) = Recorded By, (t) = Taken By, (c) = Cosigned By    Initials Name Provider Type    JAVED Francois PTA Physical Therapy Assistant          Therapy Charges for Today     Code Description Service Date Service Provider Modifiers Qty    67605506104 HC PT THER PROC GROUP 2/15/2017 Av Francois, PTA GP 1    89609546477 HC PT THER PROC EA 15 MIN 2/15/2017 Av Francois, PTA GP 1    61229264133 HC PT THER PROC GROUP 2/15/2017 Av Francois, PTA GP 1    19303076521 HC PT THER PROC EA 15 MIN 2/15/2017 Av Francois, PTA GP 1    45250432195 HC PT THER PROC GROUP 2/16/2017 Av Francois, PTA GP 1    26786648745 HC PT THER PROC EA 15 MIN 2/16/2017 Av Francois, PTA GP 1    57929270296 HC PT THER PROC GROUP 2/16/2017 Av Francois, PTA GP 1    67341985249 HC PT THER PROC EA 15 MIN 2/16/2017 Av Francois, PTA GP 1          PT G-Codes  Outcome Measure Options: AM-PAC 6 Clicks Basic Mobility (PT)    Av Francois PTA  2/16/2017

## 2017-02-16 NOTE — DISCHARGE SUMMARY
DATE OF ADMISSION:  02/14/2017  DATE OF DISCHARGE:  02/16/2017     HISTORY OF PRESENT ILLNESS: This is a 72-year-old female with a painful right knee secondary to severe degenerative arthritis. She is brought in for a knee replacement.     DIAGNOSTIC DATA: Pertinent laboratory not remarkable.     HOSPITAL COURSE: Surgery was uneventful, performed under general anesthetic. The following day, her drain and [place] were discontinued. She was started on physical therapy and ambulation.     She had a temperature of 99.7 on the 2nd day.  Her pain was minimal. She was independent. Calf nontender.  Hemoglobin 9.6.     DISPOSITION: Discharge home. Home health and therapy for at least 1 week.     DISCHARGE INSTRUCTIONS:    1. Ice and elevate the knee at heart level the majority of the time.  2. Ambulate frequently.   3. Change dressing daily and discontinue when the wound is dry.   4. May shower on the #6 postoperative day.     DISCHARGE MEDICATIONS:  She may take all previous home medications, particularly her diabetic medications and restart her Xarelto today.     FOLLOWUP:  Follow up in the office in 7 days.     DISCHARGE DIAGNOSES:   1. Severe degenerative arthritis, right knee.   2. Diabetes type 2.    PROCEDURE PERFORMED:   Total knee arthroplasty, right.     Giovany Poole M.D.  EAE:celsa  D:   02/16/2017 06:37:31  T:   02/16/2017 07:09:23  Job ID:   [id number]  Document ID:   [id number]  cc:

## 2017-02-16 NOTE — PLAN OF CARE
Problem: Inpatient Physical Therapy  Goal: Transfer Training Goal 1 LTG- PT  Outcome: Outcome(s) achieved Date Met:  02/16/17 02/16/17 1614   Transfer Training PT LTG   Transfer Training PT LTG, Date Goal Reviewed 02/16/17   Transfer Training PT LTG, Outcome goal met       Goal: Gait Training Goal LTG- PT  Outcome: Outcome(s) achieved Date Met:  02/16/17 02/16/17 1614   Gait Training PT LTG   Gait Training Goal PT LTG, Date Goal Reviewed 02/16/17   Gait Training Goal PT LTG, Outcome goal met       Goal: Stair Training Goal LTG- PT  Outcome: Outcome(s) achieved Date Met:  02/16/17 02/16/17 1614   Stair Training PT LTG   Stair Training Goal PT LTG, Date Goal Reviewed 02/16/17   Stair Training Goal PT LTG, Outcome goal met       Goal: Range of Motion Goal LTG- PT  Outcome: Unable to achieve outcome(s) by discharge Date Met:  02/16/17 02/16/17 1614   Range of Motion PT LTG   Range of Motion Goal PT LTG, Date Goal Reviewed 02/16/17   Range of Motion Goal PT LTG, Outcome goal partially met   Reason Goal Not Met (ROM) PT LTG discharged from facility       Goal: Patient Education Goal LTG- PT  Outcome: Outcome(s) achieved Date Met:  02/16/17 02/16/17 1614   Patient Education PT LTG   Patient Education PT LTG, Date Goal Reviewed 02/16/17   Patient Education PT LTG Outcome goal met

## 2017-02-16 NOTE — PLAN OF CARE
Problem: Perioperative Period (Adult)  Goal: Signs and Symptoms of Listed Potential Problems Will be Absent or Manageable (Perioperative Period)  Outcome: Outcome(s) achieved Date Met:  02/16/17

## 2017-02-16 NOTE — PLAN OF CARE
Problem: Patient Care Overview (Adult)  Goal: Plan of Care Review  Outcome: Ongoing (interventions implemented as appropriate)    02/16/17 1306   Coping/Psychosocial Response Interventions   Plan Of Care Reviewed With patient   Outcome Evaluation   Outcome Summary/Follow up Plan Pt with continued progression of functional mobility as expected, trained for proper stair climbing. No new concerns.

## 2017-02-16 NOTE — PROGRESS NOTES
Orthopedic Progress Note        Patient: Carli Blair    Date of Admission: 2/14/2017  5:39 AM    YOB: 1945    Medical Record Number: [id number]    Attending Physician: Giovany Poole MD      POD # 2     Status post- IA TOTAL KNEE ARTHROPLASTY [21046] (RT TOTAL KNEE ARTHROPLASTY)      Systemic or Specific Complaints: No Complaints      Allergies:   Allergies   Allergen Reactions   • Iodine Hives       Medications:   Current Medications:  Scheduled Meds:  acetaminophen 325 mg Oral 4x Daily   calcium carbonate 1 tablet Oral Daily   cetirizine 10 mg Oral Daily   insulin aspart 0-9 Units Subcutaneous 4x Daily AC & at Bedtime   insulin detemir 25 Units Subcutaneous Nightly   levothyroxine 112 mcg Oral Daily   metFORMIN 500 mg Oral BID With Meals   metoprolol succinate XL 25 mg Oral QAM   PARoxetine 30 mg Oral Daily   pravastatin 40 mg Oral Daily   rivaroxaban 20 mg Oral Daily With Dinner   sennosides-docusate sodium 2 tablet Oral Daily     Continuous Infusions:  lactated ringers 9 mL/hr Last Rate: 9 mL/hr (02/14/17 0647)     PRN Meds:.•  acetaminophen  •  dextrose  •  dextrose  •  diphenhydrAMINE  •  docusate sodium  •  docusate sodium  •  glucagon (human recombinant)  •  HYDROcodone-acetaminophen  •  HYDROmorphone **AND** naloxone  •  magnesium hydroxide  •  ondansetron **OR** ondansetron ODT **OR** ondansetron  •  sodium chloride      Physical Exam: 72 y.o. female  General Appearance:    alert and oriented                Pain Relief: Patient reports complete resolution   Vitals:    02/15/17 1500 02/15/17 1959 02/15/17 2253 02/16/17 0216   BP: 133/58 111/62 125/64 115/65   BP Location: Left arm Left arm Right arm Right arm   Patient Position: Sitting Lying Lying Lying   Pulse: 81 86 85 80   Resp: 16 16 16 16   Temp: 99.3 °F (37.4 °C) 99.2 °F (37.3 °C) 99.7 °F (37.6 °C)    TempSrc: Oral Oral Oral Oral   SpO2: 100% 92% 95% 94%   Weight:       Height:                  Abdomen:     Normal bowel sounds, no  masses, no organomegaly, soft        non-tender, non-distended, no guarding, no rebound                 tenderness       Extremities:   Operative extremity neurovascular status intact. ROM intact.    Incision intact w/out signs or  symptoms of infection. No           edema, no cyanosis, no calf tenderness. rom improving         Skin:     Skin Warm/Dry w/out ulceration, ecchymosis, rash, or   cyanosis     Activity: Mobilizing Per P.T.   Weight Bearing: As Tolerated    Diagnostic Tests:   Lab Results (last 24 hours)     Procedure Component Value Units Date/Time    POC Glucose Fingerstick [[id number]]  (Abnormal) Collected:  02/15/17 0622    Specimen:  Blood Updated:  02/15/17 0623     Glucose 138 (H) mg/dL     Narrative:       Meter: [id number] : [id number] Gary Mann    POC Glucose Fingerstick [[id number]]  (Abnormal) Collected:  02/15/17 1138    Specimen:  Blood Updated:  02/15/17 1140     Glucose 145 (H) mg/dL     Narrative:       Meter: [id number] : [id number] Sakina Calzada    POC Glucose Fingerstick [[id number]]  (Abnormal) Collected:  02/15/17 1554    Specimen:  Blood Updated:  02/15/17 1627     Glucose 224 (H) mg/dL     Narrative:       Meter: [id number] : [id number] Sakina Calzada    POC Glucose Fingerstick [[id number]]  (Abnormal) Collected:  02/15/17 2137    Specimen:  Blood Updated:  02/15/17 2140     Glucose 194 (H) mg/dL     Narrative:       Meter: [id number] : [id number] Angel Matthews    CBC (No Diff) [[id number]]  (Abnormal) Collected:  02/16/17 0412    Specimen:  Blood Updated:  02/16/17 0451     WBC 6.68 10*3/mm3      RBC 3.29 (L) 10*6/mm3      Hemoglobin 9.5 (L) g/dL      Hematocrit 29.5 (L) %      MCV 89.7 fL      MCH 28.9 pg      MCHC 32.2 (L) g/dL      RDW 13.6 (H) %      RDW-SD 44.8 fl      MPV 10.2 fL      Platelets 208 10*3/mm3     POC Glucose Fingerstick [[id number]]  (Abnormal) Collected:  02/16/17 0538    Specimen:  Blood Updated:  02/16/17 0543     Glucose 137 (H) mg/dL      Narrative:       Meter: [id number] : [id number] Angel Matthews             Imaging Results (last 24 hours)     ** No results found for the last 24 hours. **           Assessment:    ID TOTAL KNEE ARTHROPLASTY [11170] (RT TOTAL KNEE ARTHROPLASTY)    Post-operative Pain  Immobility    Plan:    Continue efforts to mobilize  Continue Pain Control Measures  Continue incisional Care  No pain  Temp 99.7 calf soft nontender  Hg 9.5  BP  115 / 65    Discharge Plan: dc home afdter 2nd pt today    Date: 2/16/2017  Time: 6:17 AM    Giovany Poole MD

## 2017-02-16 NOTE — PLAN OF CARE
Problem: Patient Care Overview (Adult)  Goal: Plan of Care Review  Outcome: Ongoing (interventions implemented as appropriate)    02/16/17 0448   Coping/Psychosocial Response Interventions   Plan Of Care Reviewed With patient   Outcome Evaluation   Outcome Summary/Follow up Plan VSS. Pain controlled with PO pain medication. Pt is assist x1 with transfers and ambulates with walker.        Goal: Discharge Needs Assessment  Outcome: Ongoing (interventions implemented as appropriate)    Problem: Perioperative Period (Adult)  Goal: Signs and Symptoms of Listed Potential Problems Will be Absent or Manageable (Perioperative Period)  Outcome: Ongoing (interventions implemented as appropriate)    Problem: Knee Replacement, Total (Adult)  Goal: Signs and Symptoms of Listed Potential Problems Will be Absent or Manageable (Knee Replacement, Total)  Outcome: Ongoing (interventions implemented as appropriate)    Problem: Fall Risk (Adult)  Goal: Absence of Falls  Outcome: Ongoing (interventions implemented as appropriate)

## 2017-02-16 NOTE — THERAPY DISCHARGE NOTE
Acute Care - Physical Therapy Treatment Note/Discharge  [facility]     Patient Name: Carli Blair  : 1945  MRN: [id number]  Today's Date: 2017  Onset of Illness/Injury or Date of Surgery Date: 17     Referring Physician: Virgilio    Admit Date: 2017    Visit Dx:    ICD-10-CM ICD-9-CM   1. Difficulty walking R26.2 719.7     Patient Active Problem List   Diagnosis   • Abnormal feces   • Diabetes   • Abnormal loss of weight   • Palpitations   • Murmur   • Shortness of breath   • SVT (supraventricular tachycardia)   • Essential hypertension   • Abdominal pain   • Anxiety   • Back ache   • Abdomen enlarged   • CN (constipation)   • Wears partial dentures   • Blues   • Difficulty hearing   • Brash   • Incarcerated umbilical hernia   • Anorexia   • Congested   • Cervical pain   • Night sweat   • Allergic rhinitis, seasonal   • Hypothyroidism   • Unspecified visual disturbance   • OA (osteoarthritis) of knee   • Hypersomnia   • PAF (paroxysmal atrial fibrillation)       Physical Therapy Education     Title: PT OT SLP Therapies (Resolved)     Topic: Physical Therapy (Resolved)     Point: Mobility training (Resolved)    Learning Progress Summary    Learner Readiness Method Response Comment Documented by Status   Patient Acceptance E VU  JAVED 17 1614 Done    Acceptance E VU  JAVED 17 1305 Done    Acceptance E VU  JAVED 02/15/17 1604 Done    Acceptance E VU  JAVED 02/15/17 1158 Done    Acceptance E,TB VU,NR  EE 17 1159 Done               Point: Home exercise program (Resolved)    Learning Progress Summary    Learner Readiness Method Response Comment Documented by Status   Patient Acceptance E VU  JAVED 17 1614 Done    Acceptance E VU  JAVED 17 1305 Done    Acceptance E VU  JAVED 02/15/17 1604 Done    Acceptance E VU  JAVED 02/15/17 1158 Done    Acceptance E,TB VU,NR  EE 17 1159 Done               Point: Body mechanics (Resolved)    Learning Progress Summary    Learner Readiness Method  Response Comment Documented by Status   Patient Acceptance E VU  JAVED 02/16/17 1614 Done    Acceptance E VU  JAVED 02/16/17 1305 Done    Acceptance E VU  JAVED 02/15/17 1604 Done    Acceptance E VU  JAVED 02/15/17 1158 Done    Acceptance E,TB VU,NR  EE 02/14/17 1159 Done                      User Key     Initials Effective Dates Name Provider Type Discipline    EE 12/01/15 -  Claudia Casarez, PT Physical Therapist PT    JAVED 04/24/15 -  Av Francois, PTA Physical Therapy Assistant PT                    IP PT Goals       02/16/17 1614 02/14/17 1159       Transfer Training PT LTG    Transfer Training PT LTG, Date Established  02/14/17  -EE     Transfer Training PT LTG, Time to Achieve  3 days  -EE     Transfer Training PT LTG, Activity Type  all transfers  -EE     Transfer Training PT LTG, [place] Level  supervision required  -EE     Transfer Training PT LTG, Assist Device  walker, rolling  -EE     Transfer Training PT  LTG, Date Goal Reviewed 02/16/17  -JAVED      Transfer Training PT LTG, Outcome goal met  -JAVED      Gait Training PT LTG    Gait Training Goal PT LTG, Date Established  02/14/17  -EE     Gait Training Goal PT LTG, Time to Achieve  3 days  -EE     Gait Training Goal PT LTG, [place] Level  supervision required  -EE     Gait Training Goal PT LTG, Assist Device  walker, rolling  -EE     Gait Training Goal PT LTG, Distance to Achieve  100  -EE     Gait Training Goal PT LTG, Date Goal Reviewed 02/16/17  -JAVED      Gait Training Goal PT LTG, Outcome goal met  -JAVED      Stair Training PT LTG    Stair Training Goal PT LTG, Date Established  02/14/17  -EE     Stair Training Goal PT LTG, Time to Achieve  3 days  -EE     Stair Training Goal PT LTG, Number of Steps  3  -EE     Stair Training Goal PT LTG, [place] Level  contact guard assist  -EE     Stair Training Goal PT LTG, Assist Device  2 handrails  -EE     Stair Training Goal PT LTG, Date Goal Reviewed 02/16/17  -JAVED      Stair Training Goal PT LTG, Outcome goal met  -JAVED       Range of Motion PT LTG    Range of Motion Goal PT LTG, Date Established  02/14/17  -EE     Range of Motion Goal PT LTG, Time to Achieve  3 days  -EE     Range fo Motion Goal PT LTG, Joint  R knee  -EE     Range of Motion Goal PT LTG, AROM Measure  0-90  -EE     Range of Motion Goal PT LTG, Date Goal Reviewed 02/16/17  -JAVED      Range of Motion Goal PT LTG, Outcome goal partially met  -JAVED      Reason Goal Not Met (ROM) PT LTG discharged from facility  -JAVED      Patient Education PT LTG    Patient Education PT LTG, Date Established  02/14/17  -EE     Patient Education PT LTG, Time to Achieve  3 days  -EE     Patient Education PT LTG, Education Type  HEP  -EE     Patient Education PT LTG, Education Understanding  demonstrate adequately;verbalize understanding  -EE     Patient Education PT LTG, Date Goal Reviewed 02/16/17  -JAVED      Patient Education PT LTG Outcome goal met  -JAVED        User Key  (r) = Recorded By, (t) = Taken By, (c) = Cosigned By    Initials Name Provider Type    EE Claudia Casarez, PT Physical Therapist    JAVED Francois PTA Physical Therapy Assistant              Adult Rehabilitation Note       02/16/17 1309 02/16/17 1030 02/15/17 1504    Rehab Assessment/Intervention    Discipline physical therapy assistant  -JAVED physical therapy assistant  -JAVED physical therapy assistant  -JAVED    Document Type therapy note (daily note)  -JAVED therapy note (daily note)  -JAVED therapy note (daily note)  -JAVED    Subjective Information agree to therapy  -JAVED agree to therapy  -JAVED agree to therapy  -JAVED    Patient Effort, Rehab Treatment good  -JAVED good  -JAVED good  -JAVED    Precautions/Limitations fall precautions  -JAVED fall precautions  -JAVED fall precautions  -JAVED    Recorded by [JAVED] Av Francois PTA [JAVED] Av Francois PTA [JAVED] Av Francois PTA    Pain Assessment    Pain Assessment 0-10  -JAVED 0-10  -JAVED 0-10  -JAVED    Pain Score 3  -JAVED 5  -JAVED 4  -JAVED    Pain Type Acute pain;Surgical pain  -JAVED Acute pain;Surgical pain  -JAVED Acute pain;Surgical  pain  -JAVED    Pain Location Knee  -JAVED Knee  -JAVED Knee  -JAVED    Pain Orientation Right  -JAVED Right  -JAVED Right  -JAVED    Pain Intervention(s) Ambulation/increased activity;Repositioned;Rest  -JAVED Ambulation/increased activity;Repositioned;Rest  -JAVED Ambulation/increased activity;Repositioned;Rest  -JAVED    Response to Interventions tolerated  -JAVED tolerated  -JAVED tolerated  -JAVED    Recorded by [JAVED] Av Francois PTA [JAVED] Av Francois PTA [JAVED] Av Francois PTA    Cognitive Assessment/Intervention    Current Cognitive/Communication Assessment functional  -JAVED functional  -JAVED functional  -JAVED    Orientation Status oriented x 4  -JAVED oriented x 4  -JAVED oriented x 4  -JAVED    Follows Commands/Answers Questions 100% of the time  -JAVED 100% of the time  -JAVED 100% of the time  -JAVED    Personal Safety WNL/WFL  -JAVED WNL/WFL  -JAVED WNL/WFL  -JAVED    Personal Safety Interventions fall prevention program maintained;gait belt;nonskid shoes/slippers when out of bed  -JAVED fall prevention program maintained;gait belt;nonskid shoes/slippers when out of bed  -JAVED fall prevention program maintained;gait belt;nonskid shoes/slippers when out of bed  -JAVED    Recorded by [JAVED] Av Francois PTA [JAVED] Av Francois PTA [JAVED] Av Francois PTA    ROM (Range of Motion)    General ROM  lower extremity range of motion deficits identified  -JAVED     Recorded by  [JAVED] Av Francois PTA     General LE Assessment    ROM  knee, right: LE ROM deficit  -JAVED     ROM Detail  21-80'  -JAVED     Recorded by  [JAVED] Av Francois PTA     Bed Mobility, Assessment/Treatment    Bed Mob, Supine to Sit, [place] not tested  -JAVED not tested  -JAVED not tested  -JAVED    Bed Mob, Sit to Supine, [place] not tested  -JAVED not tested  -JAVED not tested  -JAVED    Recorded by [JAVED] Av Francois PTA [JAVED] Av Francois PTA [JAVED] Av Francois PTA    Transfer Assessment/Treatment    Transfers, Sit-Stand [place] supervision required  -JAVED supervision required  -JAVED supervision required  -JAVED    Transfers, Stand-Sit [place]  supervision required  -JAVED supervision required  -JAVED supervision required  -JAVED    Transfers, Sit-Stand-Sit, Assist Device rolling walker  -JAVED rolling walker  -JAVED rolling walker  -JAVED    Transfer, Impairments strength decreased;ROM decreased;pain  -JAVED strength decreased;ROM decreased;pain  -JAVED strength decreased;ROM decreased;pain  -JAVED    Recorded by [JAVED] Av Francois PTA [JAVED] Av Francois PTA [JAVED] Av Francois PTA    Gait Assessment/Treatment    Gait, [place] Level supervision required;verbal cues required  -JAVED supervision required;verbal cues required  -JAVED supervision required;verbal cues required  -JAVED    Gait, Assistive Device rolling walker  -JAVED rolling walker  -JAVED rolling walker  -JAVED    Gait, Distance (Feet) 120  -JAVED 115  -JAVED 100  -JAVED    Gait, Gait Deviations forward flexed posture;leon decreased;right:;antalgic;decreased heel strike;step length decreased  -JAVED forward flexed posture;leon decreased;right:;antalgic;decreased heel strike;step length decreased  -JAVED forward flexed posture;leon decreased;right:;antalgic;decreased heel strike;step length decreased  -JAVED    Gait, Safety Issues  step length decreased;supplemental O2  -JAVED step length decreased;supplemental O2  -JAVED    Gait, Impairments strength decreased;ROM decreased;pain  -JAVED strength decreased;ROM decreased;pain  -JAVED strength decreased;ROM decreased;pain  -JAVED    Recorded by [JAVED] Av Francois PTA [JAVED] Av Francois PTA [JAVED] Av Francois PTA    Stairs Assessment/Treatment    Number of Stairs  4  -JAVED     Stairs, Handrail Location  both sides  -JAVED     Stairs, [place] Level  contact guard assist;supervision required  -JAVED     Stairs, Technique Used  step to step (ascending);step to step (descending)  -JAVED     Stairs, Impairments  strength decreased;pain  -JAVED     Recorded by  [JAVED] Av Francois PTA     Therapy Exercises    Exercise Protocols total knee  -JAVED total knee  -JAVED total knee  -JAVED    Total Knee Exercises right:;30 reps;completed protocol   -JAVED right:;25 reps;completed protocol  -JAVED right:;20 reps;completed protocol  -JAVED    Recorded by [JAVED] Av Francois PTA [JAVED] Av Francois PTA [JAVED] Av Francois PTA    Positioning and Restraints    Pre-Treatment Position sitting in chair/recliner  -JAVED sitting in chair/recliner  -JAVED sitting in chair/recliner  -JAVED    Post Treatment Position chair  -JAVED chair  -JAVED chair  -JAVED    In Chair reclined;call light within reach;encouraged to call for assist  -JAVED reclined;call light within reach;encouraged to call for assist  -JAVED reclined;call light within reach;encouraged to call for assist  -JAVED    Recorded by [JAVED] Av Francois PTA [JAVED] Av Francois PTA [JAVED] Av Francois PTA      02/15/17 1045          Rehab Assessment/Intervention    Discipline physical therapy assistant  -JAVED      Document Type therapy note (daily note)  -JAVED      Subjective Information agree to therapy  -JAVED      Patient Effort, Rehab Treatment good  -JAVED      Precautions/Limitations fall precautions  -JAVED      Recorded by [JAVED] Av Francois PTA      Pain Assessment    Pain Assessment 0-10  -JAVED      Pain Score 4  -JAVED      Post Pain Score 5  -JAVED      Pain Type Acute pain;Surgical pain  -JAVED      Pain Location Knee  -JAVED      Pain Orientation Right  -JAVED      Pain Intervention(s) Ambulation/increased activity;Repositioned;Rest  -JAVED      Response to Interventions tolerated  -JAVED      Recorded by [JAVED] Av Francois PTA      Cognitive Assessment/Intervention    Current Cognitive/Communication Assessment functional  -JAVED      Orientation Status oriented x 4  -JAVED      Follows Commands/Answers Questions 100% of the time  -JAVED      Personal Safety WNL/WFL  -JAVED      Personal Safety Interventions fall prevention program maintained;gait belt;nonskid shoes/slippers when out of bed  -JAVED      Recorded by [JAVED] Av Francois PTA      ROM (Range of Motion)    General ROM lower extremity range of motion deficits identified  -JAVED      Recorded by [JAVED] Av Francois PTA      General LE Assessment     ROM knee, right: LE ROM deficit  -JAVED      ROM Detail 12-84'  -JAVED      Recorded by [JAVED] Av Francois PTA      Bed Mobility, Assessment/Treatment    Bed Mob, Supine to Sit, [place] not tested  -JAVED      Bed Mob, Sit to Supine, [place] not tested  -JAVED      Recorded by [JAVED] Av Francois PTA      Transfer Assessment/Treatment    Transfers, Sit-Stand [place] supervision required  -JAVED      Transfers, Stand-Sit [place] supervision required  -JAVED      Transfers, Sit-Stand-Sit, Assist Device rolling walker  -JAVED      Transfer, Impairments strength decreased;ROM decreased;pain  -JAVED      Recorded by [JAVED] Av Francois PTA      Gait Assessment/Treatment    Gait, [place] Level supervision required;verbal cues required  -JAVED      Gait, Assistive Device rolling walker  -JAVED      Gait, Distance (Feet) 65  -JAVED      Gait, Gait Deviations forward flexed posture;leon decreased;right:;antalgic;decreased heel strike;step length decreased  -JAVED      Gait, Safety Issues step length decreased;supplemental O2  -JAVED      Gait, Impairments strength decreased;ROM decreased;pain  -JAVED      Recorded by [JAVED] Av Francois PTA      Therapy Exercises    Exercise Protocols total knee  -JAVED      Total Knee Exercises right:;15 reps;completed protocol  -JAVED      Recorded by [JAVED] Av Francois PTA      Positioning and Restraints    Pre-Treatment Position sitting in chair/recliner  -JAVED      Post Treatment Position chair  -JAVED      In Chair reclined;call light within reach;encouraged to call for assist  -JAVED      Recorded by [JAVED] Av Francois PTA        User Key  (r) = Recorded By, (t) = Taken By, (c) = Cosigned By    Initials Name Effective Dates    JAVED Francois PTA 04/24/15 -           PT Recommendation and Plan  Anticipated Discharge Disposition: home with home health  Planned Therapy Interventions: balance training, bed mobility training, gait training, home exercise program, patient/family education, ROM (Range of Motion), stair  training, strengthening, stretching, transfer training  PT Frequency: 2 times/day  Plan of Care Review  Plan Of Care Reviewed With: patient  Outcome Summary/Follow up Plan: Pt with continued progression of functional mobility as expected, trained for proper stair climbing.  No new concerns.           Outcome Measures       02/16/17 1300 02/15/17 1100 02/14/17 1200    How much help from another person do you currently need...    Turning from your back to your side while in flat bed without using bedrails? 4  -JAVED 4  -JAVED 4  -EE    Moving from lying on back to sitting on the side of a flat bed without bedrails? 4  -JAVED 4  -JAVED 4  -EE    Moving to and from a bed to a chair (including a wheelchair)? 3  -JAVED 3  -JAVED 3  -EE    Standing up from a chair using your arms (e.g., wheelchair, bedside chair)? 3  -JAVED 3  -JAVED 3  -EE    Climbing 3-5 steps with a railing? 3  -JAVED 3  -JAVED 2  -EE    To walk in hospital room? 3  -JAVED 3  -JAVED 3  -EE    AM-PAC 6 Clicks Score 20  -JAVED 20  -JAVED 19  -EE    Functional Assessment    Outcome Measure Options AM-PAC 6 Clicks Basic Mobility (PT)  -JAVED AM-PAC 6 Clicks Basic Mobility (PT)  -JAVED AM-PAC 6 Clicks Basic Mobility (PT)  -EE      User Key  (r) = Recorded By, (t) = Taken By, (c) = Cosigned By    Initials Name Provider Type    EE Claudia Casarez, PT Physical Therapist    JAVED Francois PTA Physical Therapy Assistant           Time Calculation:         PT Charges       02/16/17 1614 02/16/17 1304       Time Calculation    Start Time 1309  -JAVED 1030  -JAVED     Stop Time 1354  -JAVED 1119  -JAVED     Time Calculation (min) 45 min  -JAVED 49 min  -JAVED     PT Received On 02/16/17  -JAVED 02/16/17  -JAVED     PT - Next Appointment  02/16/17  -JAVED       User Key  (r) = Recorded By, (t) = Taken By, (c) = Cosigned By    Initials Name Provider Type    JAVED [place] PTA Physical Therapy Assistant          Therapy Charges for Today     Code Description Service Date Service Provider Modifiers Qty    56122230686 HC PT THER PROC GROUP  2/15/2017 Av Francois, PTA GP 1    41831382778 HC PT THER PROC EA 15 MIN 2/15/2017 Av Fentress, PTA GP 1    57813665591 HC PT THER PROC GROUP 2/15/2017 Av Fentress, PTA GP 1    95000962684 HC PT THER PROC EA 15 MIN 2/15/2017 Av Fentress, PTA GP 1    75960014453 HC PT THER PROC GROUP 2/16/2017 Av Fentress, PTA GP 1    80055383813 HC PT THER PROC EA 15 MIN 2/16/2017 Av Fentress, PTA GP 1    [id number] HC PT THER PROC GROUP 2/16/2017 Av Fentress, PTA GP 1    84481081185 HC PT THER PROC EA 15 MIN 2/16/2017 Av Fentress, PTA GP 1    [id number] HC PT THER PROC GROUP 2/16/2017 Av Francois, PTA GP 1    58245751021 HC PT THER PROC EA 15 MIN 2/16/2017 Av Francois, PTA GP 1          PT G-Codes  Outcome Measure Options: AM-PAC 6 Clicks Basic Mobility (PT)    PT Discharge Summary  Reason for Discharge: Discharge from facility  Outcomes Achieved: Patient able to partially acheive established goals  Discharge Destination: Home with home health, Home with assist    Av Francois PTA  2/16/2017

## 2017-06-07 ENCOUNTER — OFFICE VISIT (OUTPATIENT)
Dept: CARDIOLOGY | Facility: CLINIC | Age: 72
End: 2017-06-07

## 2017-06-07 VITALS
WEIGHT: 153 LBS | SYSTOLIC BLOOD PRESSURE: 132 MMHG | BODY MASS INDEX: 26.12 KG/M2 | HEIGHT: 64 IN | HEART RATE: 70 BPM | DIASTOLIC BLOOD PRESSURE: 76 MMHG

## 2017-06-07 DIAGNOSIS — R42 DIZZINESS: ICD-10-CM

## 2017-06-07 DIAGNOSIS — R00.2 PALPITATIONS: Primary | ICD-10-CM

## 2017-06-07 DIAGNOSIS — I47.1 SVT (SUPRAVENTRICULAR TACHYCARDIA) (HCC): ICD-10-CM

## 2017-06-07 DIAGNOSIS — I48.0 PAF (PAROXYSMAL ATRIAL FIBRILLATION) (HCC): ICD-10-CM

## 2017-06-07 DIAGNOSIS — I10 ESSENTIAL HYPERTENSION: ICD-10-CM

## 2017-06-07 PROCEDURE — 99214 OFFICE O/P EST MOD 30 MIN: CPT | Performed by: INTERNAL MEDICINE

## 2017-06-07 RX ORDER — FLUDROCORTISONE ACETATE 0.1 MG/1
0.1 TABLET ORAL DAILY
COMMUNITY
End: 2017-12-07

## 2017-06-07 RX ORDER — ALENDRONATE SODIUM 70 MG/1
70 TABLET ORAL
COMMUNITY
End: 2019-02-28 | Stop reason: ALTCHOICE

## 2017-06-07 RX ORDER — ALPRAZOLAM 0.5 MG/1
0.5 TABLET ORAL 2 TIMES DAILY PRN
COMMUNITY
End: 2020-12-23

## 2017-06-07 RX ORDER — PINDOLOL 5 MG/1
2.5 TABLET ORAL 2 TIMES DAILY
Qty: 90 TABLET | Refills: 3 | Status: SHIPPED | OUTPATIENT
Start: 2017-06-07 | End: 2017-12-07
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
## 2017-06-07 NOTE — PATIENT INSTRUCTIONS
Stop the metoprolol.  Start pindolol 1/2 tab twice daily.  Call me in 3 weeks    Heart Disease Prevention  Heart disease is a leading cause of death. There are many things you can do to help prevent heart disease.  BE PHYSICALLY ACTIVE  Physical activity is good for your heart. It helps control your blood pressure, cholesterol levels, and weight. Try to be physically active every day. Ask your health care provider what activities are best for you.   BE A HEALTHY WEIGHT  Extra weight can strain your heart and affect your blood pressure and cholesterol levels. Lose weight with diet and exercise if recommended by your health care provider.  EAT HEART-HEALTHY FOODS  Follow a healthy eating plan as recommended by your health care provider or dietitian. Heart-healthy foods include:   · High-fiber foods. These include oat bran, oatmeal, and whole-grain breads and cereals.  · Fruits and vegetables.  Avoid:  · Alcohol.  · Fried foods.  · Foods high in saturated fat. These include meats, butter, whole dairy products, shortening, and coconut or palm oil.  · Salty foods. These include canned food, luncheon meat, salty snacks, and fast food.  KEEP YOUR CHOLESTEROL LEVELS UNDER CONTROL  Cholesterol is a substance that is used for many important functions. When your cholesterol levels are high, cholesterol can stick to the insides of your blood vessels, making them narrow or clog. This can lead to chest pain (angina) and a heart attack.   Keep your cholesterol levels under control as recommended by your health care provider. Have your cholesterol checked at least once a year. Target cholesterol levels (in mg/dL) for most people are:   · Total cholesterol below 200.  · LDL cholesterol below 100.  · HDL cholesterol above 40 in men and above 50 in women.  · Triglycerides below 150.  KEEP YOUR BLOOD PRESSURE UNDER CONTROL  Having high blood pressure (hypertension) puts you at risk for stroke and other forms of heart disease. Keep your  blood pressure under control as recommended by your health care provider. Ask your health care provider if you need treatment to lower your blood pressure. If you are 18-39 years of age, have your blood pressure checked every 3-5 years. If you are 40 years of age or older, have your blood pressure checked every year.  DO NOT USE TOBACCO PRODUCTS  Tobacco smoke can damage your heart and blood vessels. Do not use any tobacco products including cigarettes, chewing tobacco, or electronic cigarettes. If you need help quitting, ask your health care provider.  TAKE MEDICINES AS DIRECTED  Take medicines only as directed by your health care provider. Ask your health care provider whether you should take an aspirin every day. Taking aspirin can help reduce your risk of heart disease and stroke.   FOR MORE INFORMATION   To find out more about heart disease, visit the American Heart Association's website at www.americanheart.org     This information is not intended to replace advice given to you by your health care provider. Make sure you discuss any questions you have with your health care provider.     Document Released: 08/01/2005 Document Revised: 01/08/2016 Document Reviewed: 02/11/2015  [employer] Interactive Patient Education ©2017 [employer]

## 2017-07-11 ENCOUNTER — TELEPHONE (OUTPATIENT)
Dept: CARDIOLOGY | Facility: CLINIC | Age: 72
End: 2017-07-11

## 2017-07-11 NOTE — TELEPHONE ENCOUNTER
Pt called to report about her palpatations. She said that they are now few and far between but when she does have them, they last about 3-4 minutes and that they scare her sometimes. She said it makes her eyes and chest feel funny. Pt is currently taking pindolol 2.5mg bid. Any changes or continue with what she id doing? Her c/b is [phone].

## 2017-07-12 NOTE — TELEPHONE ENCOUNTER
Have her increase her pindolol to 5 mg twice daily and then call back in 10-2 weeks- let's see if she feels better with a higher dose of the pindolol

## 2017-08-16 DIAGNOSIS — R00.2 PALPITATIONS: Primary | ICD-10-CM

## 2017-08-16 RX ORDER — METOPROLOL SUCCINATE 25 MG/1
25 TABLET, EXTENDED RELEASE ORAL DAILY
Qty: 90 TABLET | Refills: 3 | Status: SHIPPED | OUTPATIENT
Start: 2017-08-16 | End: 2018-10-10 | Stop reason: SDUPTHER

## 2017-12-07 ENCOUNTER — OFFICE VISIT (OUTPATIENT)
Dept: CARDIOLOGY | Facility: CLINIC | Age: 72
End: 2017-12-07

## 2017-12-07 VITALS
SYSTOLIC BLOOD PRESSURE: 130 MMHG | BODY MASS INDEX: 26.63 KG/M2 | DIASTOLIC BLOOD PRESSURE: 72 MMHG | HEART RATE: 67 BPM | WEIGHT: 156 LBS | HEIGHT: 64 IN

## 2017-12-07 DIAGNOSIS — I10 ESSENTIAL HYPERTENSION: Chronic | ICD-10-CM

## 2017-12-07 DIAGNOSIS — I48.0 PAF (PAROXYSMAL ATRIAL FIBRILLATION) (HCC): Primary | Chronic | ICD-10-CM

## 2017-12-07 DIAGNOSIS — I47.1 SVT (SUPRAVENTRICULAR TACHYCARDIA) (HCC): Chronic | ICD-10-CM

## 2017-12-07 DIAGNOSIS — R00.2 PALPITATIONS: Chronic | ICD-10-CM

## 2017-12-07 PROCEDURE — 99214 OFFICE O/P EST MOD 30 MIN: CPT | Performed by: INTERNAL MEDICINE

## 2017-12-07 PROCEDURE — 93000 ELECTROCARDIOGRAM COMPLETE: CPT | Performed by: INTERNAL MEDICINE

## 2017-12-07 NOTE — PATIENT INSTRUCTIONS
Paroxysmal Supraventricular Tachycardia  Paroxysmal supraventricular tachycardia (PSVT) is a type of abnormal heart rhythm. It causes your heart to beat very quickly and then suddenly stop beating so quickly. A normal heart rate is  beats per minute. During an episode of PSVT, your heart rate may be 150-250 beats per minute. This can make you feel light-headed and short of breath. An episode of PSVT can be frightening. It is usually not dangerous.  The heart has four chambers. All chambers need to work together for the heart to beat effectively. A normal heartbeat usually starts in the right upper chamber of the heart (atrium) when an area (sinoatrial node) puts out an electrical signal that spreads to the other chambers. People with PSVT may have abnormal electrical pathways, or they may have other areas in the upper chambers that send out electrical signals. The result is a very rapid heartbeat.  When your heart beats very quickly, it does not have time to fill completely with blood. When PSVT happens often or it lasts for long periods, it can lead to heart weakness and failure. Most people with PSVT do not have any other heart disease.  CAUSES  Abnormal electrical activity in the heart causes PSVT. It is not known why some people get PSVT and others do not.  RISK FACTORS  You may be more likely to have PSVT if:  · You are 20-30 years old.  · You are a woman.  Other factors that may increase your chances of an attack include:  · Stress.  · Being tired.  · Smoking.  · Stimulant drugs.  · Alcoholic drinks.  · Caffeine.  · Pregnancy.  SIGNS AND SYMPTOMS  A mild episode of PSVT may cause no symptoms. If you do have signs and symptoms, they may include:  · A pounding heart.  · Feeling of skipped heartbeats (palpitations).  · Weakness.  · Shortness of breath.  · Tightness or pain in your chest.  · Light-headedness.  · Anxiety.  · Dizziness.  · Sweating.  · Nausea.  · A fainting spell.  DIAGNOSIS  Your health care  provider may suspect PSVT if you have symptoms that come and go. The health care provider will do a physical exam. If you are having an episode during the exam, the health care provider may be able to diagnose PSVT by listening to your heart and feeling your pulse. Tests may also be done, including:  · An electrical study of your heart (electrocardiogram, or ECG).  · A test in which you wear a portable ECG monitor all day (Holter monitor) or for several days (event monitor).  · A test that involves taking an image of your heart using sound waves (echocardiogram) to rule out other causes of a fast heart rate.  TREATMENT  You may not need treatment if episodes of PSVT do not happen often or if they do not cause symptoms. If PSVT episodes do cause symptoms, your health care provider may first suggest trying a self-treatment called vagus nerve stimulation. The vagus nerve extends down from the brain. It regulates certain body functions. Stimulating this nerve can slow down the heart. Your health care provider can teach you ways to do this. You may need to try a few ways to find what works best for you. Options include:  · Holding your breath and pushing, as though you are having a bowel movement.  · Massaging an area on one side of your neck below your jaw.  · Bending forward with your head between your legs.  · Bending forward with your head between your legs and coughing.  · Massaging your eyeballs with your eyes closed.  If vagus nerve stimulation does not work, other treatment options include:  · Medicines to prevent an attack.  · Being treated in the hospital with medicine or electric shock to stop an attack (cardioversion). This treatment can include:    Getting medicine through an IV line.    Having a small electric shock delivered to your heart. You will be given medicine to make you sleep through this procedure.  · If you have frequent episodes with symptoms, you may need a procedure to get rid of the faulty  areas of your heart (radiofrequency ablation) and end the episodes of PSVT. In this procedure:    A long, thin tube (catheter) is passed through one of your veins into your heart.    Energy directed through the catheter eliminates the areas of your heart that are causing abnormal electric stimulation.  HOME CARE INSTRUCTIONS  · Take medicines only as directed by your health care provider.  · Do not use caffeine in any form if caffeine triggers episodes of PSVT. Otherwise, consume caffeine in moderation. This means no more than a few cups of coffee or the equivalent each day.  · Do not drink alcohol if alcohol triggers episodes of PSVT. Otherwise, limit alcohol intake to no more than 1 drink per day for nonpregnant women and 2 drinks per day for men. One drink equals 12 ounces of beer, 5 ounces of wine, or 1½ ounces of hard liquor.  · Do not use any tobacco products, including cigarettes, chewing tobacco, or electronic cigarettes. If you need help quitting, ask your health care provider.  · Try to get at least 7 hours of sleep each night.  · Find healthy ways to manage stress.  · Perform vagus nerve stimulation as directed by your health care provider.  · Maintain a healthy weight.  · Get some exercise on most days. Ask your health care provider to suggest some good activities for you.  SEEK MEDICAL CARE IF:  · You are having episodes of PSVT more often, or they are lasting longer.  · Vagus nerve stimulation is no longer helping.  · You have new symptoms during an episode.  SEEK IMMEDIATE MEDICAL CARE IF:  · You have chest pain or trouble breathing.  · You have an episode of PSVT that has lasted longer than 20 minutes.  · You have passed out from an episode of PSVT.  These symptoms may represent a serious problem that is an emergency. Do not wait to see if the symptoms will go away. Get medical help right away. Call your local emergency services (911 in the [place] Do not drive yourself to the hospital.     This  information is not intended to replace advice given to you by your health care provider. Make sure you discuss any questions you have with your health care provider.     Document Released: 12/18/2006 Document Revised: 01/08/2016 Document Reviewed: 05/28/2015  [employer] Interactive Patient Education ©2017 [employer]      Heart Disease Prevention  Heart disease is a leading cause of death. There are many things you can do to help prevent heart disease.  BE PHYSICALLY ACTIVE  Physical activity is good for your heart. It helps control your blood pressure, cholesterol levels, and weight. Try to be physically active every day. Ask your health care provider what activities are best for you.   BE A HEALTHY WEIGHT  Extra weight can strain your heart and affect your blood pressure and cholesterol levels. Lose weight with diet and exercise if recommended by your health care provider.  EAT HEART-HEALTHY FOODS  Follow a healthy eating plan as recommended by your health care provider or dietitian. Heart-healthy foods include:   · High-fiber foods. These include oat bran, oatmeal, and whole-grain breads and cereals.  · Fruits and vegetables.  Avoid:  · Alcohol.  · Fried foods.  · Foods high in saturated fat. These include meats, butter, whole dairy products, shortening, and coconut or palm oil.  · Salty foods. These include canned food, luncheon meat, salty snacks, and fast food.  KEEP YOUR CHOLESTEROL LEVELS UNDER CONTROL  Cholesterol is a substance that is used for many important functions. When your cholesterol levels are high, cholesterol can stick to the insides of your blood vessels, making them narrow or clog. This can lead to chest pain (angina) and a heart attack.   Keep your cholesterol levels under control as recommended by your health care provider. Have your cholesterol checked at least once a year. Target cholesterol levels (in mg/dL) for most people are:   · Total cholesterol below 200.  · LDL cholesterol below  100.  · HDL cholesterol above 40 in men and above 50 in women.  · Triglycerides below 150.  KEEP YOUR BLOOD PRESSURE UNDER CONTROL  Having high blood pressure (hypertension) puts you at risk for stroke and other forms of heart disease. Keep your blood pressure under control as recommended by your health care provider. Ask your health care provider if you need treatment to lower your blood pressure. If you are 18-39 years of age, have your blood pressure checked every 3-5 years. If you are 40 years of age or older, have your blood pressure checked every year.  DO NOT USE TOBACCO PRODUCTS  Tobacco smoke can damage your heart and blood vessels. Do not use any tobacco products including cigarettes, chewing tobacco, or electronic cigarettes. If you need help quitting, ask your health care provider.  TAKE MEDICINES AS DIRECTED  Take medicines only as directed by your health care provider. Ask your health care provider whether you should take an aspirin every day. Taking aspirin can help reduce your risk of heart disease and stroke.   FOR MORE INFORMATION   To find out more about heart disease, visit the American Heart Association's website at www.americanheart.org     This information is not intended to replace advice given to you by your health care provider. Make sure you discuss any questions you have with your health care provider.     Document Released: 08/01/2005 Document Revised: 01/08/2016 Document Reviewed: 02/11/2015  [employer] Interactive Patient Education ©2017 [employer]
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
## 2018-04-02 ENCOUNTER — TRANSCRIBE ORDERS (OUTPATIENT)
Dept: ADMINISTRATIVE | Facility: HOSPITAL | Age: 73
End: 2018-04-02

## 2018-04-02 DIAGNOSIS — Z12.39 BREAST SCREENING: Primary | ICD-10-CM

## 2018-04-05 ENCOUNTER — HOSPITAL ENCOUNTER (OUTPATIENT)
Dept: MAMMOGRAPHY | Facility: HOSPITAL | Age: 73
Discharge: HOME | End: 2018-04-05
Admitting: FAMILY MEDICINE

## 2018-04-05 DIAGNOSIS — Z12.39 BREAST SCREENING: ICD-10-CM

## 2018-04-05 PROCEDURE — 77067 SCR MAMMO BI INCL CAD: CPT

## 2018-04-05 PROCEDURE — 77063 BREAST TOMOSYNTHESIS BI: CPT

## 2018-04-09 ENCOUNTER — OFFICE VISIT (OUTPATIENT)
Dept: GASTROENTEROLOGY | Facility: CLINIC | Age: 73
End: 2018-04-09

## 2018-04-09 VITALS
HEART RATE: 63 BPM | BODY MASS INDEX: 26.46 KG/M2 | SYSTOLIC BLOOD PRESSURE: 125 MMHG | WEIGHT: 155 LBS | DIASTOLIC BLOOD PRESSURE: 72 MMHG | HEIGHT: 64 IN

## 2018-04-09 DIAGNOSIS — R10.13 EPIGASTRIC PAIN: Primary | ICD-10-CM

## 2018-04-09 DIAGNOSIS — Z86.010 HISTORY OF COLON POLYPS: ICD-10-CM

## 2018-04-09 PROBLEM — Z86.0100 HISTORY OF COLON POLYPS: Status: ACTIVE | Noted: 2018-04-09

## 2018-04-09 PROCEDURE — 99213 OFFICE O/P EST LOW 20 MIN: CPT | Performed by: INTERNAL MEDICINE

## 2018-04-09 RX ORDER — ACETAMINOPHEN 325 MG/1
650 TABLET ORAL EVERY 6 HOURS PRN
COMMUNITY

## 2018-04-09 RX ORDER — OMEPRAZOLE 40 MG/1
CAPSULE, DELAYED RELEASE ORAL
COMMUNITY
Start: 2018-03-06 | End: 2019-02-28 | Stop reason: ALTCHOICE

## 2018-04-09 RX ORDER — SODIUM CHLORIDE, SODIUM LACTATE, POTASSIUM CHLORIDE, CALCIUM CHLORIDE 600; 310; 30; 20 MG/100ML; MG/100ML; MG/100ML; MG/100ML
30 INJECTION, SOLUTION INTRAVENOUS CONTINUOUS
Status: CANCELLED | OUTPATIENT
Start: 2018-05-03

## 2018-04-09 RX ORDER — TRAZODONE HYDROCHLORIDE 100 MG/1
150 TABLET ORAL NIGHTLY
COMMUNITY
End: 2021-07-28

## 2018-04-09 RX ORDER — LEVOCETIRIZINE DIHYDROCHLORIDE 5 MG/1
5 TABLET, FILM COATED ORAL EVERY EVENING
COMMUNITY
Start: 2018-03-30 | End: 2021-03-17

## 2018-04-10 ENCOUNTER — TELEPHONE (OUTPATIENT)
Dept: CARDIOLOGY | Facility: CLINIC | Age: 73
End: 2018-04-10

## 2018-04-10 NOTE — TELEPHONE ENCOUNTER
Pt is schedule for colonoscopy on 5/3/18 with Dr. Shay. Is it ok for the pt to hold her Xarelto 4 days prior? Please advise    Fax#: [phone]    Thanks Debbie

## 2018-05-03 ENCOUNTER — HOSPITAL ENCOUNTER (OUTPATIENT)
Facility: HOSPITAL | Age: 73
Setting detail: HOSPITAL OUTPATIENT SURGERY
Discharge: HOME | End: 2018-05-03
Attending: INTERNAL MEDICINE | Admitting: INTERNAL MEDICINE

## 2018-05-03 ENCOUNTER — ANESTHESIA EVENT (OUTPATIENT)
Dept: GASTROENTEROLOGY | Facility: HOSPITAL | Age: 73
End: 2018-05-03

## 2018-05-03 ENCOUNTER — ANESTHESIA (OUTPATIENT)
Dept: GASTROENTEROLOGY | Facility: HOSPITAL | Age: 73
End: 2018-05-03

## 2018-05-03 VITALS
OXYGEN SATURATION: 99 % | WEIGHT: 154 LBS | BODY MASS INDEX: 26.43 KG/M2 | HEART RATE: 68 BPM | TEMPERATURE: 98.1 F | RESPIRATION RATE: 16 BRPM | SYSTOLIC BLOOD PRESSURE: 149 MMHG | DIASTOLIC BLOOD PRESSURE: 74 MMHG

## 2018-05-03 DIAGNOSIS — Z86.010 HISTORY OF COLON POLYPS: ICD-10-CM

## 2018-05-03 DIAGNOSIS — R10.13 EPIGASTRIC PAIN: ICD-10-CM

## 2018-05-03 LAB — GLUCOSE BLDC GLUCOMTR-MCNC: 199 MG/DL (ref 70–130)

## 2018-05-03 PROCEDURE — G0105 COLORECTAL SCRN; HI RISK IND: HCPCS | Performed by: INTERNAL MEDICINE

## 2018-05-03 PROCEDURE — 82962 GLUCOSE BLOOD TEST: CPT

## 2018-05-03 PROCEDURE — 25010000002 PROPOFOL 10 MG/ML EMULSION: Performed by: ANESTHESIOLOGY

## 2018-05-03 PROCEDURE — 88312 SPECIAL STAINS GROUP 1: CPT | Performed by: INTERNAL MEDICINE

## 2018-05-03 PROCEDURE — 88305 TISSUE EXAM BY PATHOLOGIST: CPT | Performed by: INTERNAL MEDICINE

## 2018-05-03 PROCEDURE — 43239 EGD BIOPSY SINGLE/MULTIPLE: CPT | Performed by: INTERNAL MEDICINE

## 2018-05-03 RX ORDER — PROPOFOL 10 MG/ML
VIAL (ML) INTRAVENOUS AS NEEDED
Status: DISCONTINUED | OUTPATIENT
Start: 2018-05-03 | End: 2018-05-03 | Stop reason: SURG

## 2018-05-03 RX ORDER — PROPOFOL 10 MG/ML
VIAL (ML) INTRAVENOUS CONTINUOUS PRN
Status: DISCONTINUED | OUTPATIENT
Start: 2018-05-03 | End: 2018-05-03 | Stop reason: SURG

## 2018-05-03 RX ORDER — LIDOCAINE HYDROCHLORIDE 20 MG/ML
INJECTION, SOLUTION INFILTRATION; PERINEURAL AS NEEDED
Status: DISCONTINUED | OUTPATIENT
Start: 2018-05-03 | End: 2018-05-03 | Stop reason: SURG

## 2018-05-03 RX ORDER — SODIUM CHLORIDE, SODIUM LACTATE, POTASSIUM CHLORIDE, CALCIUM CHLORIDE 600; 310; 30; 20 MG/100ML; MG/100ML; MG/100ML; MG/100ML
1000 INJECTION, SOLUTION INTRAVENOUS CONTINUOUS
Status: DISCONTINUED | OUTPATIENT
Start: 2018-05-03 | End: 2018-05-03 | Stop reason: HOSPADM

## 2018-05-03 RX ADMIN — PROPOFOL 140 MCG/KG/MIN: 10 INJECTION, EMULSION INTRAVENOUS at 10:13

## 2018-05-03 RX ADMIN — SODIUM CHLORIDE, POTASSIUM CHLORIDE, SODIUM LACTATE AND CALCIUM CHLORIDE: 600; 310; 30; 20 INJECTION, SOLUTION INTRAVENOUS at 10:13

## 2018-05-03 RX ADMIN — LIDOCAINE HYDROCHLORIDE 40 MG: 20 INJECTION, SOLUTION INFILTRATION; PERINEURAL at 10:12

## 2018-05-03 RX ADMIN — PROPOFOL 100 MG: 10 INJECTION, EMULSION INTRAVENOUS at 10:13

## 2018-05-03 RX ADMIN — SODIUM CHLORIDE, POTASSIUM CHLORIDE, SODIUM LACTATE AND CALCIUM CHLORIDE 1000 ML: 600; 310; 30; 20 INJECTION, SOLUTION INTRAVENOUS at 09:22

## 2018-05-03 NOTE — ANESTHESIA PREPROCEDURE EVALUATION
Anesthesia Evaluation     Nursing notes reviewed   NPO Solid Status: > 8 hours  NPO Liquid Status: > 8 hours           Airway   Mallampati: II  Dental    (+) implants    Comment: Risks of dental trauma discussed    Pulmonary    (+) shortness of breath, sleep apnea,   Cardiovascular     Rate: normal    (+) hypertension, dysrhythmias Paroxysmal Atrial Fib, PVC,       Neuro/Psych  GI/Hepatic/Renal/Endo    (+)  hiatal hernia,  diabetes mellitus,     Musculoskeletal     Abdominal    Substance History      OB/GYN          Other                        Anesthesia Plan    ASA 3     MAC     intravenous induction   Anesthetic plan and risks discussed with patient.

## 2018-05-03 NOTE — H&P (VIEW-ONLY)
Subjective   Carli Blair is a 73 y.o. female is here today for follow-up.  Chief Complaint   Patient presents with   • Colonoscopy     5 year recall   • Abdominal Pain     Epigastric   • Constipation     History of Present Illness  Patient has been experiencing epigastric distress for about 2 months.  It is not severe.  She doesn't know what provoked it.  It comes and goes.  She is due for a 5 year recall colonoscopy because of history of colon polyps.  The following portions of the patient's history were reviewed and updated as appropriate: allergies, current medications, past family history, past medical history, past social history, past surgical history and problem list.    Review of Systems   Respiratory: Negative for shortness of breath.    Cardiovascular: Negative for chest pain.   All other systems reviewed and are negative.      Objective   Physical Exam   Constitutional: She appears well-developed and well-nourished.   Nursing note and vitals reviewed.        Assessment/Plan   Problems Addressed this Visit        Nervous and Auditory    Abdominal pain - Primary    Relevant Orders    Case Request (Completed)       Other    History of colon polyps    Relevant Orders    Case Request (Completed)      Other Visit Diagnoses    None.       EGD and colonoscopy scheduled.

## 2018-05-03 NOTE — ANESTHESIA POSTPROCEDURE EVALUATION
Patient: Carli Blair    Procedure Summary     Date:  05/03/18 Room / Location:  [facility] ENDOSCOPY 1 / [facility] ENDOSCOPY    Anesthesia Start:  1009 Anesthesia Stop:  1043    Procedures:       ESOPHAGOGASTRODUODENOSCOPY with biopsy (N/A Esophagus)      COLONOSCOPY into cecum (N/A ) Diagnosis:       Epigastric pain      History of colon polyps      (Epigastric pain [R10.13])      (History of colon polyps [Z86.010])    Surgeon:  Ricardo Shay MD Provider:  Arik Mendez MD    Anesthesia Type:  MAC ASA Status:  3          Anesthesia Type: MAC  Last vitals  BP   149/74 (05/03/18 1105)   Temp   36.7 °C (98.1 °F) (05/03/18 1051)   Pulse   68 (05/03/18 1105)   Resp   16 (05/03/18 0910)     SpO2   99 % (05/03/18 1105)     Post Anesthesia Care and Evaluation    Patient location during evaluation: bedside  Patient participation: complete - patient participated  Level of consciousness: awake and alert  Pain score: 0  Pain management: adequate  Airway patency: patent  Anesthetic complications: No anesthetic complications  PONV Status: none  Cardiovascular status: acceptable  Respiratory status: acceptable  Hydration status: acceptable  Post Neuraxial Block status: Motor and sensory function returned to baseline

## 2018-05-04 LAB
CYTO UR: NORMAL
LAB AP CASE REPORT: NORMAL
Lab: NORMAL
PATH REPORT.FINAL DX SPEC: NORMAL
PATH REPORT.GROSS SPEC: NORMAL

## 2018-10-10 DIAGNOSIS — R00.2 PALPITATIONS: ICD-10-CM

## 2018-10-10 RX ORDER — METOPROLOL SUCCINATE 25 MG/1
25 TABLET, EXTENDED RELEASE ORAL DAILY
Qty: 90 TABLET | Refills: 0 | Status: SHIPPED | OUTPATIENT
Start: 2018-10-10 | End: 2019-02-26 | Stop reason: SDUPTHER

## 2019-01-22 ENCOUNTER — APPOINTMENT (OUTPATIENT)
Dept: GENERAL RADIOLOGY | Facility: HOSPITAL | Age: 74
End: 2019-01-22

## 2019-01-22 ENCOUNTER — HOSPITAL ENCOUNTER (EMERGENCY)
Facility: HOSPITAL | Age: 74
Discharge: HOME | End: 2019-01-22
Attending: EMERGENCY MEDICINE | Admitting: EMERGENCY MEDICINE

## 2019-01-22 VITALS
SYSTOLIC BLOOD PRESSURE: 141 MMHG | OXYGEN SATURATION: 96 % | BODY MASS INDEX: 26.8 KG/M2 | WEIGHT: 157 LBS | RESPIRATION RATE: 16 BRPM | HEIGHT: 64 IN | TEMPERATURE: 99.4 F | HEART RATE: 64 BPM | DIASTOLIC BLOOD PRESSURE: 83 MMHG

## 2019-01-22 DIAGNOSIS — R07.89 CHEST WALL PAIN: Primary | ICD-10-CM

## 2019-01-22 LAB
ALBUMIN SERPL-MCNC: 4 G/DL (ref 3.5–5.2)
ALBUMIN/GLOB SERPL: 1.7 G/DL
ALP SERPL-CCNC: 66 U/L (ref 40–129)
ALT SERPL W P-5'-P-CCNC: 8 U/L (ref 5–33)
ANION GAP SERPL CALCULATED.3IONS-SCNC: 12.4 MMOL/L
AST SERPL-CCNC: 11 U/L (ref 5–32)
BASOPHILS # BLD AUTO: 0.02 10*3/MM3 (ref 0–0.2)
BASOPHILS NFR BLD AUTO: 0.4 % (ref 0–2)
BILIRUB SERPL-MCNC: 0.3 MG/DL (ref 0.2–1.2)
BUN BLD-MCNC: 9 MG/DL (ref 8–23)
BUN/CREAT SERPL: 15.5 (ref 7–25)
CALCIUM SPEC-SCNC: 8.9 MG/DL (ref 8.8–10.5)
CHLORIDE SERPL-SCNC: 99 MMOL/L (ref 98–107)
CO2 SERPL-SCNC: 27.6 MMOL/L (ref 22–29)
CREAT BLD-MCNC: 0.58 MG/DL (ref 0.57–1)
DEPRECATED RDW RBC AUTO: 41.9 FL (ref 37–54)
EOSINOPHIL # BLD AUTO: 0.13 10*3/MM3 (ref 0.1–0.3)
EOSINOPHIL NFR BLD AUTO: 2.7 % (ref 0–4)
ERYTHROCYTE [DISTWIDTH] IN BLOOD BY AUTOMATED COUNT: 12.9 % (ref 11.5–14.5)
GFR SERPL CREATININE-BSD FRML MDRD: 102 ML/MIN/1.73
GLOBULIN UR ELPH-MCNC: 2.4 GM/DL
GLUCOSE BLD-MCNC: 127 MG/DL (ref 65–99)
HCT VFR BLD AUTO: 38.2 % (ref 37–47)
HGB BLD-MCNC: 12.2 G/DL (ref 12–16)
IMM GRANULOCYTES # BLD AUTO: 0.01 10*3/MM3 (ref 0–0.03)
IMM GRANULOCYTES NFR BLD AUTO: 0.2 % (ref 0–0.5)
LYMPHOCYTES # BLD AUTO: 1.28 10*3/MM3 (ref 0.6–4.8)
LYMPHOCYTES NFR BLD AUTO: 26.6 % (ref 20–45)
MCH RBC QN AUTO: 28.4 PG (ref 27–31)
MCHC RBC AUTO-ENTMCNC: 31.9 G/DL (ref 31–37)
MCV RBC AUTO: 89 FL (ref 81–99)
MONOCYTES # BLD AUTO: 0.39 10*3/MM3 (ref 0–1)
MONOCYTES NFR BLD AUTO: 8.1 % (ref 3–8)
NEUTROPHILS # BLD AUTO: 2.98 10*3/MM3 (ref 1.5–8.3)
NEUTROPHILS NFR BLD AUTO: 62 % (ref 45–70)
NRBC BLD AUTO-RTO: 0 /100 WBC (ref 0–0)
PLATELET # BLD AUTO: 266 10*3/MM3 (ref 140–500)
PMV BLD AUTO: 10 FL (ref 7.4–10.4)
POTASSIUM BLD-SCNC: 3.8 MMOL/L (ref 3.5–5.2)
PROT SERPL-MCNC: 6.4 G/DL (ref 6–8.5)
RBC # BLD AUTO: 4.29 10*6/MM3 (ref 4.2–5.4)
SODIUM BLD-SCNC: 139 MMOL/L (ref 136–145)
TROPONIN T SERPL-MCNC: <0.01 NG/ML (ref 0–0.03)
WBC NRBC COR # BLD: 4.81 10*3/MM3 (ref 4.8–10.8)

## 2019-01-22 PROCEDURE — 99284 EMERGENCY DEPT VISIT MOD MDM: CPT

## 2019-01-22 PROCEDURE — 71046 X-RAY EXAM CHEST 2 VIEWS: CPT

## 2019-01-22 PROCEDURE — 85025 COMPLETE CBC W/AUTO DIFF WBC: CPT | Performed by: EMERGENCY MEDICINE

## 2019-01-22 PROCEDURE — 80053 COMPREHEN METABOLIC PANEL: CPT | Performed by: EMERGENCY MEDICINE

## 2019-01-22 PROCEDURE — 84484 ASSAY OF TROPONIN QUANT: CPT | Performed by: EMERGENCY MEDICINE

## 2019-01-22 PROCEDURE — 99284 EMERGENCY DEPT VISIT MOD MDM: CPT | Performed by: EMERGENCY MEDICINE

## 2019-01-22 RX ORDER — SODIUM CHLORIDE 0.9 % (FLUSH) 0.9 %
10 SYRINGE (ML) INJECTION AS NEEDED
Status: DISCONTINUED | OUTPATIENT
Start: 2019-01-22 | End: 2019-01-22 | Stop reason: HOSPADM
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
## 2019-01-22 NOTE — DISCHARGE INSTRUCTIONS
Please return to the emergency room for any worsening pain, fevers, weakness, coughing, numbness, difficulties breathing or any other concerns.  He will need to follow-up with your primary care doctor urgently for further evaluation and outpatient testing such as stress test, or mammogram or ultrasound as we discussed.

## 2019-01-24 ENCOUNTER — TRANSCRIBE ORDERS (OUTPATIENT)
Dept: ADMINISTRATIVE | Facility: HOSPITAL | Age: 74
End: 2019-01-24

## 2019-01-24 DIAGNOSIS — N64.4 BREAST PAIN, LEFT: Primary | ICD-10-CM

## 2019-01-30 ENCOUNTER — HOSPITAL ENCOUNTER (OUTPATIENT)
Dept: MAMMOGRAPHY | Facility: HOSPITAL | Age: 74
Discharge: HOME | End: 2019-01-30
Admitting: FAMILY MEDICINE

## 2019-01-30 ENCOUNTER — HOSPITAL ENCOUNTER (OUTPATIENT)
Dept: ULTRASOUND IMAGING | Facility: HOSPITAL | Age: 74
Discharge: HOME | End: 2019-01-30

## 2019-01-30 DIAGNOSIS — N64.4 BREAST PAIN, LEFT: ICD-10-CM

## 2019-01-30 PROCEDURE — G0279 TOMOSYNTHESIS, MAMMO: HCPCS

## 2019-01-30 PROCEDURE — 76642 ULTRASOUND BREAST LIMITED: CPT

## 2019-01-30 PROCEDURE — 77066 DX MAMMO INCL CAD BI: CPT

## 2019-02-26 DIAGNOSIS — R00.2 PALPITATIONS: ICD-10-CM

## 2019-02-26 RX ORDER — METOPROLOL SUCCINATE 25 MG/1
25 TABLET, EXTENDED RELEASE ORAL DAILY
Qty: 30 TABLET | Refills: 0 | Status: SHIPPED | OUTPATIENT
Start: 2019-02-26 | End: 2019-02-28 | Stop reason: SDUPTHER

## 2019-02-28 ENCOUNTER — OFFICE VISIT (OUTPATIENT)
Dept: CARDIOLOGY | Facility: CLINIC | Age: 74
End: 2019-02-28

## 2019-02-28 VITALS
BODY MASS INDEX: 27.14 KG/M2 | WEIGHT: 159 LBS | SYSTOLIC BLOOD PRESSURE: 112 MMHG | HEART RATE: 64 BPM | HEIGHT: 64 IN | DIASTOLIC BLOOD PRESSURE: 70 MMHG

## 2019-02-28 DIAGNOSIS — I48.0 PAF (PAROXYSMAL ATRIAL FIBRILLATION) (HCC): Primary | Chronic | ICD-10-CM

## 2019-02-28 DIAGNOSIS — I47.1 SVT (SUPRAVENTRICULAR TACHYCARDIA) (HCC): Chronic | ICD-10-CM

## 2019-02-28 DIAGNOSIS — R00.2 PALPITATIONS: Chronic | ICD-10-CM

## 2019-02-28 DIAGNOSIS — I10 ESSENTIAL HYPERTENSION: Chronic | ICD-10-CM

## 2019-02-28 PROCEDURE — 93000 ELECTROCARDIOGRAM COMPLETE: CPT | Performed by: INTERNAL MEDICINE

## 2019-02-28 PROCEDURE — 99214 OFFICE O/P EST MOD 30 MIN: CPT | Performed by: INTERNAL MEDICINE

## 2019-02-28 RX ORDER — PANTOPRAZOLE SODIUM 20 MG/1
20 TABLET, DELAYED RELEASE ORAL DAILY
COMMUNITY
End: 2020-12-23

## 2019-02-28 RX ORDER — METOPROLOL SUCCINATE 25 MG/1
25 TABLET, EXTENDED RELEASE ORAL DAILY
Qty: 90 TABLET | Refills: 3 | Status: SHIPPED | OUTPATIENT
Start: 2019-02-28 | End: 2019-07-02 | Stop reason: SDUPTHER
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
## 2019-03-11 ENCOUNTER — TRANSCRIBE ORDERS (OUTPATIENT)
Dept: ADMINISTRATIVE | Facility: HOSPITAL | Age: 74
End: 2019-03-11

## 2019-03-11 DIAGNOSIS — M81.0 OSTEOPOROSIS, POST-MENOPAUSAL: Primary | ICD-10-CM

## 2019-03-15 ENCOUNTER — APPOINTMENT (OUTPATIENT)
Dept: BONE DENSITY | Facility: HOSPITAL | Age: 74
End: 2019-03-15

## 2019-03-22 ENCOUNTER — APPOINTMENT (OUTPATIENT)
Dept: BONE DENSITY | Facility: HOSPITAL | Age: 74
End: 2019-03-22

## 2019-03-22 DIAGNOSIS — M81.0 OSTEOPOROSIS, POST-MENOPAUSAL: ICD-10-CM

## 2019-03-22 PROCEDURE — 77080 DXA BONE DENSITY AXIAL: CPT

## 2019-07-02 ENCOUNTER — TELEPHONE (OUTPATIENT)
Dept: CARDIOLOGY | Facility: CLINIC | Age: 74
End: 2019-07-02

## 2019-07-02 DIAGNOSIS — R00.2 PALPITATIONS: Chronic | ICD-10-CM

## 2019-07-02 RX ORDER — METOPROLOL SUCCINATE 25 MG/1
25 TABLET, EXTENDED RELEASE ORAL DAILY
Qty: 90 TABLET | Refills: 1 | Status: SHIPPED | OUTPATIENT
Start: 2019-07-02 | End: 2019-07-03 | Stop reason: SDUPTHER

## 2019-07-02 NOTE — TELEPHONE ENCOUNTER
Patient left me a message that she needs Metoprolol sent into [facility].  I called patient back and there was no answer or voicemail.  I sent in the RX.

## 2019-07-03 DIAGNOSIS — R00.2 PALPITATIONS: Chronic | ICD-10-CM

## 2019-07-03 RX ORDER — METOPROLOL SUCCINATE 25 MG/1
25 TABLET, EXTENDED RELEASE ORAL DAILY
Qty: 30 TABLET | Refills: 0 | Status: SHIPPED | OUTPATIENT
Start: 2019-07-03 | End: 2019-09-24

## 2019-09-12 ENCOUNTER — OFFICE VISIT (OUTPATIENT)
Dept: CARDIOLOGY | Facility: CLINIC | Age: 74
End: 2019-09-12

## 2019-09-12 ENCOUNTER — HOSPITAL ENCOUNTER (OUTPATIENT)
Dept: CARDIOLOGY | Facility: HOSPITAL | Age: 74
Discharge: HOME | End: 2019-09-12
Admitting: NURSE PRACTITIONER

## 2019-09-12 VITALS
SYSTOLIC BLOOD PRESSURE: 110 MMHG | HEART RATE: 61 BPM | BODY MASS INDEX: 26.82 KG/M2 | WEIGHT: 157.1 LBS | HEIGHT: 64 IN | DIASTOLIC BLOOD PRESSURE: 60 MMHG

## 2019-09-12 DIAGNOSIS — I48.0 PAF (PAROXYSMAL ATRIAL FIBRILLATION) (HCC): ICD-10-CM

## 2019-09-12 DIAGNOSIS — I47.1 SVT (SUPRAVENTRICULAR TACHYCARDIA) (HCC): Primary | Chronic | ICD-10-CM

## 2019-09-12 DIAGNOSIS — I48.0 PAF (PAROXYSMAL ATRIAL FIBRILLATION) (HCC): Chronic | ICD-10-CM

## 2019-09-12 DIAGNOSIS — I10 ESSENTIAL HYPERTENSION: Chronic | ICD-10-CM

## 2019-09-12 DIAGNOSIS — I47.1 SVT (SUPRAVENTRICULAR TACHYCARDIA) (HCC): ICD-10-CM

## 2019-09-12 PROCEDURE — 93000 ELECTROCARDIOGRAM COMPLETE: CPT | Performed by: NURSE PRACTITIONER

## 2019-09-12 PROCEDURE — 99214 OFFICE O/P EST MOD 30 MIN: CPT | Performed by: NURSE PRACTITIONER

## 2019-09-12 PROCEDURE — 93225 XTRNL ECG REC<48 HRS REC: CPT

## 2019-09-12 PROCEDURE — 93226 XTRNL ECG REC<48 HR SCAN A/R: CPT

## 2019-09-12 RX ORDER — PEN NEEDLE, DIABETIC 31 G X1/4"
NEEDLE, DISPOSABLE MISCELLANEOUS
COMMUNITY
Start: 2019-07-04

## 2019-09-12 RX ORDER — FLUTICASONE PROPIONATE 50 MCG
1 SPRAY, SUSPENSION (ML) NASAL DAILY
COMMUNITY
Start: 2019-05-14

## 2019-09-12 RX ORDER — PEN NEEDLE, DIABETIC 31 G X1/4"
NEEDLE, DISPOSABLE MISCELLANEOUS
COMMUNITY
Start: 2018-08-17 | End: 2021-07-28

## 2019-09-12 RX ORDER — POLYETHYLENE GLYCOL 3350 17 G/17G
17 POWDER, FOR SOLUTION ORAL DAILY PRN
COMMUNITY
Start: 2019-03-11 | End: 2022-03-16

## 2019-09-12 NOTE — PROGRESS NOTES
"    Subjective:     Encounter Date:09/12/2019      Patient ID: Carli Blair is a 74 y.o. female.    Chief Complaint: 6 month follow up  History of Present Illness   She is a new patient to me and I have reviewed her past medical records.  She is a patient of Dr. Meyer.  She has a history of SVT and PAF.    She presents today, 9/12/19, with complaints of increasing palpitations.  She states about a month ago she had noticed increased episodes of palpitations and fatigue.  Then yesterday,  she had quite a stressful situation and had to do a lot of \"running around trying to find her grandson's notebook that she thinks her  dropped on in the [employer]\".  During her errands, she became very symptomatic with palpitations.  She states she got really tired, developed a headache and just overall felt very poorly.  She describes some mild shortness of breath during this time and chest pain.  She also noted that the palpitations make her dizzy at times.  She does not feel that she has chest pain without the palpitations.  Her fatigue has worsened over the last month.  When she got home she took her blood pressure and it was 96/50.  She also noted that she was in such a hurry to leave yesterday morning that she forgot to take her metoprolol but then did take it when she got home.  She states she rechecked her blood pressure and it did recover to a systolic of 110.       This patient has no other known cardiac history.  This patient has no history of coronary artery disease, congestive heart failure, rheumatic fever, rheumatic heart disease, congenital heart disease or heart murmur.  This patient has never required invasive cardiovascular evaluation.  Her most recent echocardiogram performed in March 2016 demonstrated an ejection fraction of 71%.     The following portions of the patient's history were reviewed and updated as appropriate: allergies, current medications, past family history, past medical history, past " social history, past surgical history and problem list.    Review of Systems   Constitution: Negative for weakness and malaise/fatigue.   HENT: Negative for congestion, hoarse voice and sore throat.    Eyes: Negative for blurred vision, double vision, photophobia, vision loss in left eye and vision loss in right eye.   Cardiovascular: Negative for chest pain, dyspnea on exertion, irregular heartbeat, leg swelling, near-syncope, orthopnea, palpitations, paroxysmal nocturnal dyspnea and syncope.   Respiratory: Negative for cough, hemoptysis, mild shortness of breath with palpitations, sleep disturbances due to breathing, snoring, sputum production and wheezing.    Endocrine: Negative.    Hematologic/Lymphatic: Does not bruise/bleed easily.   Skin: Negative for color change, dry skin, poor wound healing and rash.   Musculoskeletal: Negative for back pain, falls, gout, joint pain, joint swelling, muscle cramps and muscle weakness.   Gastrointestinal: Negative for abdominal pain, constipation, diarrhea, dysphagia, melena, nausea and vomiting.   Neurological: Negative for excessive daytime sleepiness, dizziness, headaches, light-headedness, loss of balance, numbness, paresthesias, seizures and vertigo.   Psychiatric/Behavioral: Negative for depression and substance abuse. The patient is not nervous/anxious.        ECG 12 Lead  Date/Time: 9/12/2019 9:35 AM  Performed by: Kamila Espinosa APRN  Authorized by: Kamila Espinosa APRN   Comparison: compared with previous ECG from 2/28/2019  Similar to previous ECG  Rhythm: sinus rhythm  Rate: normal  Conduction: conduction normal  ST Segments: ST segments normal  T Waves: T waves normal  QRS axis: normal    Clinical impression: normal ECG               Objective:         Physical Exam   Constitutional: He is oriented to person, place, and time. Vital signs are normal. He appears well-developed and well-nourished. No distress.   HENT:   Head: Normocephalic and atraumatic.   Right  "Ear: Hearing normal.   Left Ear: Hearing normal.   Eyes: Conjunctivae and lids are normal.   Neck: Normal range of motion. Neck supple. No JVD present. Carotid bruit is not present. No thyromegaly present.   Cardiovascular: Normal rate, regular rhythm, S1 normal, S2 normal, normal heart sounds and intact distal pulses.  PMI is not displaced.  Exam reveals no gallop.    No murmur heard.  Pulses:       Carotid pulses are 2+ on the right side, and 2+ on the left side.       Radial pulses are 2+ on the right side, and 2+ on the left side.        Dorsalis pedis pulses are 2+ on the right side, and 2+ on the left side.        Posterior tibial pulses are 2+ on the right side, and 2+ on the left side.   Pulmonary/Chest: Effort normal and breath sounds normal. No respiratory distress. He has no wheezes. He has no rhonchi. He has no rales. He exhibits no tenderness.   Abdominal: Soft. Normal appearance and bowel sounds are normal. He exhibits no distension, no abdominal bruit and no mass. There is no tenderness.   Musculoskeletal: Normal range of motion.   Exhibits no edema or deformity   Lymphadenopathy:     He has no cervical adenopathy.   Neurological: He is alert and oriented to person, place, and time. No cranial nerve deficit. Coordination and gait normal.   Oriented to person, place and time.   Skin: Skin is warm, dry and intact. No rash noted. He is not diaphoretic. No cyanosis. Nails show no clubbing.   Psychiatric: He has a normal mood and affect. His speech is normal and behavior is normal. Judgment and thought content normal. Cognition and memory are normal.     Vitals:    09/12/19 0918   BP: 110/60   Pulse: 61   Weight: 71.3 kg (157 lb 1.6 oz)   Height: 162.6 cm (64\")           Lab Review:       Assessment:          Diagnosis Plan   1. SVT (supraventricular tachycardia) (CMS/HCC)  Holter Monitor - 24 Hour    Adult Transthoracic Echo Complete W/ Cont if Necessary Per Protocol   2. PAF (paroxysmal atrial " fibrillation) (CMS/[facility])  Holter Monitor - 24 Hour    Adult Transthoracic Echo Complete W/ Cont if Necessary Per Protocol   3. Essential hypertension            Plan:       1/2.  SVT/PAF - patient having increased, symptomatic palpitations.  Check Holter and echo.   Atrial Fibrillation and Atrial Flutter  Assessment  • The patient has paroxysmal atrial fibrillation  • This is non-valvular in etiology  • The patient's CHADS2-VASc score is 5  • A [id number] score of 2 or more is considered a high risk for a thromboembolic event  • Apixaban prescribed    Plan  • Attempt to maintain sinus rhythm  • Continue rivaroxaban for antithrombotic therapy, bleeding issues discussed  • Continue beta blocker for rhythm control  • Continue beta blocker for rate control    3.  Essential HTN - controlled    Further treatment will be predicated on the results of testing.    YUVAL Lee      Current Outpatient Medications:   •  acetaminophen (TYLENOL) 325 MG tablet, Take 650 mg by mouth Every 6 (Six) Hours As Needed for Mild Pain ., Disp: , Rfl:   •  ALPRAZolam (XANAX) 0.5 MG tablet, Take 0.5 mg by mouth 2 (Two) Times a Day As Needed for Anxiety., Disp: , Rfl:   •  Calcium Carb-Cholecalciferol (CALCIUM-VITAMIN D) 600-400 MG-UNIT tablet, Take 1 tablet by mouth., Disp: , Rfl:   •  cholecalciferol (VITAMIN D3) 1000 UNITS tablet, Take 7,000 Units by mouth Daily., Disp: , Rfl:   •  diclofenac (VOLTAREN) 1 % gel gel, Apply 4 g topically to the appropriate area as directed 4 (Four) Times a Day As Needed., Disp: , Rfl:   •  fluticasone (FLONASE) 50 MCG/ACT nasal spray, 1 spray into the nostril(s) as directed by provider Daily., Disp: , Rfl:   •  insulin aspart (novoLOG) 100 UNIT/ML injection, Inject  under the skin 3 (Three) Times a Day Before Meals., Disp: , Rfl:   •  insulin detemir (LEVEMIR) 100 UNIT/ML injection, Inject 23 Units under the skin into the appropriate area as directed Every Night., Disp: , Rfl:   •  Insulin Pen Needle  (PEN NEEDLES) 31G X 6 MM misc, , Disp: , Rfl:   •  Lactobacillus (ULTIMATE PROBIOTIC FORMULA) capsule, Take 1 tablet by mouth Daily., Disp: , Rfl:   •  levocetirizine (XYZAL) 5 MG tablet, , Disp: , Rfl:   •  levothyroxine (SYNTHROID, LEVOTHROID) 112 MCG tablet, Take 112 mcg by mouth Daily. 1 tablet by mouth M-Sat and 2 tablets on sunday, Disp: , Rfl:   •  metFORMIN (GLUCOPHAGE) 500 MG tablet, Take 500 mg by mouth 2 (Two) Times a Day With Meals., Disp: , Rfl:   •  metoprolol succinate XL (TOPROL-XL) 25 MG 24 hr tablet, Take 1 tablet by mouth Daily., Disp: 30 tablet, Rfl: 0  •  pantoprazole (PROTONIX) 20 MG EC tablet, Take 20 mg by mouth Daily., Disp: , Rfl:   •  polyethylene glycol (MIRALAX) packet, Take 17 g by mouth., Disp: , Rfl:   •  pravastatin (PRAVACHOL) 40 MG tablet, Take 40 mg by mouth daily., Disp: , Rfl:   •  rivaroxaban (XARELTO) 20 MG tablet, Take 1 tablet by mouth Every Morning. (Patient taking differently: Take 20 mg by mouth Every Morning. PATIENT TO CONSULT WITH DR. JEROME MAYEN AND/OR DR. MONROY REGARDING HOLDING  PRIOR TO SURGERY), Disp: 42 tablet, Rfl: 0  •  senna-docusate (SENNA S) 8.6-50 MG per tablet, Take 2 tablets by mouth Daily., Disp: , Rfl:   •  traZODone (DESYREL) 100 MG tablet, Take 100 mg by mouth Every Night., Disp: , Rfl:   •  vitamin B-12 (CYANOCOBALAMIN) 1000 MCG tablet, Take 1,000 mcg by mouth Daily. HOLD PRIOR TO SURGERY, Disp: , Rfl:   •  Insulin Pen Needle (PEN NEEDLES) 31G X 6 MM misc, , Disp: , Rfl:   •  PARoxetine (PAXIL) 30 MG tablet, Take 30 mg by mouth Every Morning., Disp: , Rfl:

## 2019-09-13 DIAGNOSIS — I47.1 SVT (SUPRAVENTRICULAR TACHYCARDIA) (HCC): Primary | Chronic | ICD-10-CM

## 2019-09-13 PROCEDURE — 93227 XTRNL ECG REC<48 HR R&I: CPT | Performed by: INTERNAL MEDICINE

## 2019-09-24 ENCOUNTER — OFFICE VISIT (OUTPATIENT)
Dept: CARDIOLOGY | Facility: CLINIC | Age: 74
End: 2019-09-24

## 2019-09-24 VITALS
HEART RATE: 61 BPM | DIASTOLIC BLOOD PRESSURE: 80 MMHG | HEIGHT: 64 IN | SYSTOLIC BLOOD PRESSURE: 124 MMHG | WEIGHT: 154 LBS | BODY MASS INDEX: 26.29 KG/M2

## 2019-09-24 DIAGNOSIS — R00.2 PALPITATIONS: Primary | ICD-10-CM

## 2019-09-24 PROCEDURE — 93000 ELECTROCARDIOGRAM COMPLETE: CPT | Performed by: INTERNAL MEDICINE

## 2019-09-24 PROCEDURE — 99204 OFFICE O/P NEW MOD 45 MIN: CPT | Performed by: INTERNAL MEDICINE

## 2019-09-24 RX ORDER — ATENOLOL 25 MG/1
25 TABLET ORAL DAILY
Qty: 90 TABLET | Refills: 3 | Status: SHIPPED | OUTPATIENT
Start: 2019-09-24 | End: 2020-03-13 | Stop reason: SDUPTHER
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
## 2019-09-25 ENCOUNTER — TRANSCRIBE ORDERS (OUTPATIENT)
Dept: ADMINISTRATIVE | Facility: HOSPITAL | Age: 74
End: 2019-09-25

## 2019-09-25 DIAGNOSIS — G45.3 AMAUROSIS FUGAX: Primary | ICD-10-CM

## 2019-09-30 ENCOUNTER — HOSPITAL ENCOUNTER (OUTPATIENT)
Dept: ULTRASOUND IMAGING | Facility: HOSPITAL | Age: 74
Discharge: HOME | End: 2019-09-30
Admitting: INTERNAL MEDICINE

## 2019-09-30 DIAGNOSIS — G45.3 AMAUROSIS FUGAX: ICD-10-CM

## 2019-09-30 PROCEDURE — 93880 EXTRACRANIAL BILAT STUDY: CPT

## 2019-10-10 ENCOUNTER — HOSPITAL ENCOUNTER (OUTPATIENT)
Dept: CARDIOLOGY | Facility: HOSPITAL | Age: 74
End: 2019-10-10

## 2019-11-06 ENCOUNTER — HOSPITAL ENCOUNTER (OUTPATIENT)
Dept: CARDIOLOGY | Facility: HOSPITAL | Age: 74
Discharge: HOME | End: 2019-11-06
Admitting: NURSE PRACTITIONER

## 2019-11-06 DIAGNOSIS — I48.0 PAF (PAROXYSMAL ATRIAL FIBRILLATION) (HCC): ICD-10-CM

## 2019-11-06 DIAGNOSIS — I47.1 SVT (SUPRAVENTRICULAR TACHYCARDIA) (HCC): ICD-10-CM

## 2019-11-06 LAB
BH CV ECHO MEAS - AO MAX PG (FULL): 2.7 MMHG
BH CV ECHO MEAS - AO MAX PG: 6.4 MMHG
BH CV ECHO MEAS - AO MEAN PG (FULL): 1 MMHG
BH CV ECHO MEAS - AO MEAN PG: 3 MMHG
BH CV ECHO MEAS - AO ROOT AREA (BSA CORRECTED): 1.6
BH CV ECHO MEAS - AO ROOT AREA: 6.2 CM^2
BH CV ECHO MEAS - AO ROOT DIAM: 2.8 CM
BH CV ECHO MEAS - AO V2 MAX: 126 CM/SEC
BH CV ECHO MEAS - AO V2 MEAN: 85.7 CM/SEC
BH CV ECHO MEAS - AO V2 VTI: 28.5 CM
BH CV ECHO MEAS - AVA(I,A): 2.4 CM^2
BH CV ECHO MEAS - AVA(I,D): 2.4 CM^2
BH CV ECHO MEAS - AVA(V,A): 2.2 CM^2
BH CV ECHO MEAS - AVA(V,D): 2.2 CM^2
BH CV ECHO MEAS - BSA(HAYCOCK): 1.8 M^2
BH CV ECHO MEAS - BSA: 1.8 M^2
BH CV ECHO MEAS - BZI_BMI: 26.4 KILOGRAMS/M^2
BH CV ECHO MEAS - BZI_METRIC_HEIGHT: 162.6 CM
BH CV ECHO MEAS - BZI_METRIC_WEIGHT: 69.9 KG
BH CV ECHO MEAS - EDV(CUBED): 82.9 ML
BH CV ECHO MEAS - EDV(MOD-SP2): 84.2 ML
BH CV ECHO MEAS - EDV(MOD-SP4): 70.5 ML
BH CV ECHO MEAS - EDV(TEICH): 85.8 ML
BH CV ECHO MEAS - EF(CUBED): 89 %
BH CV ECHO MEAS - EF(MOD-BP): 62 %
BH CV ECHO MEAS - EF(MOD-SP2): 68.3 %
BH CV ECHO MEAS - EF(MOD-SP4): 55 %
BH CV ECHO MEAS - EF(TEICH): 83.4 %
BH CV ECHO MEAS - ESV(CUBED): 9.1 ML
BH CV ECHO MEAS - ESV(MOD-SP2): 26.7 ML
BH CV ECHO MEAS - ESV(MOD-SP4): 31.7 ML
BH CV ECHO MEAS - ESV(TEICH): 14.2 ML
BH CV ECHO MEAS - FS: 52.1 %
BH CV ECHO MEAS - IVS/LVPW: 0.89
BH CV ECHO MEAS - IVSD: 0.77 CM
BH CV ECHO MEAS - LAT PEAK E' VEL: 6 CM/SEC
BH CV ECHO MEAS - LV DIASTOLIC VOL/BSA (35-75): 40.3 ML/M^2
BH CV ECHO MEAS - LV MASS(C)D: 111.6 GRAMS
BH CV ECHO MEAS - LV MASS(C)DI: 63.8 GRAMS/M^2
BH CV ECHO MEAS - LV MAX PG: 3.7 MMHG
BH CV ECHO MEAS - LV MEAN PG: 2 MMHG
BH CV ECHO MEAS - LV SYSTOLIC VOL/BSA (12-30): 18.1 ML/M^2
BH CV ECHO MEAS - LV V1 MAX: 96.1 CM/SEC
BH CV ECHO MEAS - LV V1 MEAN: 72.7 CM/SEC
BH CV ECHO MEAS - LV V1 VTI: 24 CM
BH CV ECHO MEAS - LVIDD: 4.4 CM
BH CV ECHO MEAS - LVIDS: 2.1 CM
BH CV ECHO MEAS - LVLD AP2: 7.5 CM
BH CV ECHO MEAS - LVLD AP4: 7.2 CM
BH CV ECHO MEAS - LVLS AP2: 6 CM
BH CV ECHO MEAS - LVLS AP4: 6.4 CM
BH CV ECHO MEAS - LVOT AREA (M): 2.8 CM^2
BH CV ECHO MEAS - LVOT AREA: 2.8 CM^2
BH CV ECHO MEAS - LVOT DIAM: 1.9 CM
BH CV ECHO MEAS - LVPWD: 0.87 CM
BH CV ECHO MEAS - MED PEAK E' VEL: 5 CM/SEC
BH CV ECHO MEAS - MV A DUR: 0.13 SEC
BH CV ECHO MEAS - MV A MAX VEL: 104 CM/SEC
BH CV ECHO MEAS - MV DEC SLOPE: 342 CM/SEC^2
BH CV ECHO MEAS - MV DEC TIME: 296 SEC
BH CV ECHO MEAS - MV E MAX VEL: 95.6 CM/SEC
BH CV ECHO MEAS - MV E/A: 0.92
BH CV ECHO MEAS - MV MAX PG: 5 MMHG
BH CV ECHO MEAS - MV MEAN PG: 2 MMHG
BH CV ECHO MEAS - MV P1/2T MAX VEL: 105 CM/SEC
BH CV ECHO MEAS - MV P1/2T: 89.9 MSEC
BH CV ECHO MEAS - MV V2 MAX: 112 CM/SEC
BH CV ECHO MEAS - MV V2 MEAN: 70.3 CM/SEC
BH CV ECHO MEAS - MV V2 VTI: 43.9 CM
BH CV ECHO MEAS - MVA P1/2T LCG: 2.1 CM^2
BH CV ECHO MEAS - MVA(P1/2T): 2.4 CM^2
BH CV ECHO MEAS - MVA(VTI): 1.6 CM^2
BH CV ECHO MEAS - RAP SYSTOLE: 3 MMHG
BH CV ECHO MEAS - SI(AO): 100.3 ML/M^2
BH CV ECHO MEAS - SI(CUBED): 42.1 ML/M^2
BH CV ECHO MEAS - SI(LVOT): 38.9 ML/M^2
BH CV ECHO MEAS - SI(MOD-SP2): 32.8 ML/M^2
BH CV ECHO MEAS - SI(MOD-SP4): 22.2 ML/M^2
BH CV ECHO MEAS - SI(TEICH): 40.9 ML/M^2
BH CV ECHO MEAS - SV(AO): 175.5 ML
BH CV ECHO MEAS - SV(CUBED): 73.8 ML
BH CV ECHO MEAS - SV(LVOT): 68 ML
BH CV ECHO MEAS - SV(MOD-SP2): 57.5 ML
BH CV ECHO MEAS - SV(MOD-SP4): 38.8 ML
BH CV ECHO MEAS - SV(TEICH): 71.6 ML
BH CV ECHO MEAS - TAPSE (>1.6): 1.9 CM
BH CV ECHO MEASUREMENTS AVERAGE E/E' RATIO: 17.38
BH CV XLRA - RV BASE: 2.5 CM
BH CV XLRA - TDI S': 13 CM/SEC
LEFT ATRIUM VOLUME INDEX: 17 ML/M2
LV EF 2D ECHO EST: 62 %

## 2019-11-06 PROCEDURE — 93306 TTE W/DOPPLER COMPLETE: CPT | Performed by: INTERNAL MEDICINE

## 2019-11-06 PROCEDURE — 93306 TTE W/DOPPLER COMPLETE: CPT

## 2019-12-26 ENCOUNTER — OFFICE VISIT (OUTPATIENT)
Dept: CARDIOLOGY | Facility: CLINIC | Age: 74
End: 2019-12-26

## 2019-12-26 VITALS
DIASTOLIC BLOOD PRESSURE: 60 MMHG | BODY MASS INDEX: 26.29 KG/M2 | HEART RATE: 63 BPM | SYSTOLIC BLOOD PRESSURE: 120 MMHG | HEIGHT: 64 IN | WEIGHT: 154 LBS

## 2019-12-26 DIAGNOSIS — R55 SYNCOPE AND COLLAPSE: Primary | ICD-10-CM

## 2019-12-26 PROCEDURE — 93000 ELECTROCARDIOGRAM COMPLETE: CPT | Performed by: INTERNAL MEDICINE

## 2019-12-26 PROCEDURE — 99214 OFFICE O/P EST MOD 30 MIN: CPT | Performed by: INTERNAL MEDICINE
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
## 2020-02-10 ENCOUNTER — TELEPHONE (OUTPATIENT)
Dept: CARDIOLOGY | Facility: CLINIC | Age: 75
End: 2020-02-10

## 2020-02-10 NOTE — TELEPHONE ENCOUNTER
Best to take the tylenol as they recommend, no need to go off of the xarelto unless she was instructed to do so by the ER

## 2020-02-10 NOTE — TELEPHONE ENCOUNTER
Pt is currently in [place], [place] on a mission trip, and had a injury due to a fall.  Pt went to the ED and she has no broken bones, but they instructed pt to take tylenol for the pain.  Pt is on Xarelto for PAF and would like to know if she can take Ibuprofen and if so would she have to be off her Xarelto for a day or two.

## 2020-03-04 ENCOUNTER — OFFICE VISIT (OUTPATIENT)
Dept: CARDIOLOGY | Facility: CLINIC | Age: 75
End: 2020-03-04

## 2020-03-04 VITALS
BODY MASS INDEX: 25.95 KG/M2 | WEIGHT: 152 LBS | HEART RATE: 71 BPM | SYSTOLIC BLOOD PRESSURE: 124 MMHG | HEIGHT: 64 IN | DIASTOLIC BLOOD PRESSURE: 80 MMHG

## 2020-03-04 DIAGNOSIS — R00.2 PALPITATIONS: Chronic | ICD-10-CM

## 2020-03-04 DIAGNOSIS — I47.1 SVT (SUPRAVENTRICULAR TACHYCARDIA) (HCC): Primary | Chronic | ICD-10-CM

## 2020-03-04 PROCEDURE — 99214 OFFICE O/P EST MOD 30 MIN: CPT | Performed by: INTERNAL MEDICINE

## 2020-03-04 RX ORDER — ROSUVASTATIN CALCIUM 20 MG/1
20 TABLET, COATED ORAL DAILY
COMMUNITY
End: 2023-03-01 | Stop reason: ALTCHOICE
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
## 2020-03-13 RX ORDER — ATENOLOL 50 MG/1
50 TABLET ORAL DAILY
Qty: 90 TABLET | Refills: 1 | Status: SHIPPED | OUTPATIENT
Start: 2020-03-13 | End: 2020-09-11

## 2020-03-19 ENCOUNTER — TELEPHONE (OUTPATIENT)
Dept: CARDIOLOGY | Facility: CLINIC | Age: 75
End: 2020-03-19

## 2020-03-19 NOTE — TELEPHONE ENCOUNTER
PT called with her BP reading. her atenolol was increase to 50 mg daily.     3/14/20: 133/71    3/15/20: 139/76    3/16/20: 127/68    3/17/20: 114/83    3/18/20: 122/71    3/19/20: 129/71    Please advise PT#: [phone]    Thanks Debbie

## 2020-05-11 ENCOUNTER — TELEPHONE (OUTPATIENT)
Dept: CARDIOLOGY | Facility: CLINIC | Age: 75
End: 2020-05-11

## 2020-05-11 NOTE — TELEPHONE ENCOUNTER
Pt LVM regarding Xarelto 20 MG samples.     I spoke to the pt.  We do not have any samples available. I will check later on this week to see if we have any samples.     Thanks Debbie

## 2020-05-18 NOTE — TELEPHONE ENCOUNTER
We have xarelto 20 MG samples. Will be ready for  tomorrow at the [place] office.    LM on pt vm with information.    Thanks Jackeline

## 2020-09-11 RX ORDER — ATENOLOL 50 MG/1
TABLET ORAL
Qty: 90 TABLET | Refills: 1 | Status: SHIPPED | OUTPATIENT
Start: 2020-09-11 | End: 2021-03-18

## 2020-09-16 DIAGNOSIS — I48.0 PAROXYSMAL ATRIAL FIBRILLATION (HCC): ICD-10-CM

## 2020-12-23 ENCOUNTER — APPOINTMENT (OUTPATIENT)
Dept: CT IMAGING | Facility: HOSPITAL | Age: 75
End: 2020-12-23

## 2020-12-23 ENCOUNTER — HOSPITAL ENCOUNTER (EMERGENCY)
Facility: HOSPITAL | Age: 75
Discharge: HOME | End: 2020-12-23
Attending: EMERGENCY MEDICINE | Admitting: EMERGENCY MEDICINE

## 2020-12-23 VITALS
SYSTOLIC BLOOD PRESSURE: 161 MMHG | HEIGHT: 64 IN | HEART RATE: 58 BPM | RESPIRATION RATE: 18 BRPM | DIASTOLIC BLOOD PRESSURE: 78 MMHG | OXYGEN SATURATION: 95 % | TEMPERATURE: 96.6 F | WEIGHT: 155 LBS | BODY MASS INDEX: 26.46 KG/M2

## 2020-12-23 DIAGNOSIS — R10.30 LOWER ABDOMINAL PAIN: Primary | ICD-10-CM

## 2020-12-23 DIAGNOSIS — K52.9 COLITIS: ICD-10-CM

## 2020-12-23 LAB
ALBUMIN SERPL-MCNC: 3.9 G/DL (ref 3.5–5.2)
ALBUMIN/GLOB SERPL: 1.5 G/DL
ALP SERPL-CCNC: 63 U/L (ref 39–117)
ALT SERPL W P-5'-P-CCNC: <5 U/L (ref 1–33)
ANION GAP SERPL CALCULATED.3IONS-SCNC: 7.2 MMOL/L (ref 5–15)
AST SERPL-CCNC: 15 U/L (ref 1–32)
BASOPHILS # BLD AUTO: 0.05 10*3/MM3 (ref 0–0.2)
BASOPHILS NFR BLD AUTO: 0.7 % (ref 0–1.5)
BILIRUB SERPL-MCNC: 0.2 MG/DL (ref 0–1.2)
BILIRUB UR QL STRIP: NEGATIVE
BUN SERPL-MCNC: 10 MG/DL (ref 8–23)
BUN/CREAT SERPL: 12.2 (ref 7–25)
CALCIUM SPEC-SCNC: 8.8 MG/DL (ref 8.6–10.5)
CHLORIDE SERPL-SCNC: 102 MMOL/L (ref 98–107)
CLARITY UR: CLEAR
CO2 SERPL-SCNC: 29.8 MMOL/L (ref 22–29)
COLOR UR: YELLOW
CREAT SERPL-MCNC: 0.82 MG/DL (ref 0.57–1)
DEPRECATED RDW RBC AUTO: 41.1 FL (ref 37–54)
EOSINOPHIL # BLD AUTO: 0.07 10*3/MM3 (ref 0–0.4)
EOSINOPHIL NFR BLD AUTO: 1 % (ref 0.3–6.2)
ERYTHROCYTE [DISTWIDTH] IN BLOOD BY AUTOMATED COUNT: 12.6 % (ref 12.3–15.4)
GFR SERPL CREATININE-BSD FRML MDRD: 68 ML/MIN/1.73
GLOBULIN UR ELPH-MCNC: 2.6 GM/DL
GLUCOSE SERPL-MCNC: 108 MG/DL (ref 65–99)
GLUCOSE UR STRIP-MCNC: NEGATIVE MG/DL
HCT VFR BLD AUTO: 37 % (ref 34–46.6)
HGB BLD-MCNC: 11.9 G/DL (ref 12–15.9)
HGB UR QL STRIP.AUTO: NEGATIVE
IMM GRANULOCYTES # BLD AUTO: 0.01 10*3/MM3 (ref 0–0.05)
IMM GRANULOCYTES NFR BLD AUTO: 0.1 % (ref 0–0.5)
KETONES UR QL STRIP: NEGATIVE
LEUKOCYTE ESTERASE UR QL STRIP.AUTO: NEGATIVE
LIPASE SERPL-CCNC: 26 U/L (ref 13–60)
LYMPHOCYTES # BLD AUTO: 2.14 10*3/MM3 (ref 0.7–3.1)
LYMPHOCYTES NFR BLD AUTO: 31.6 % (ref 19.6–45.3)
MCH RBC QN AUTO: 29.1 PG (ref 26.6–33)
MCHC RBC AUTO-ENTMCNC: 32.2 G/DL (ref 31.5–35.7)
MCV RBC AUTO: 90.5 FL (ref 79–97)
MONOCYTES # BLD AUTO: 0.38 10*3/MM3 (ref 0.1–0.9)
MONOCYTES NFR BLD AUTO: 5.6 % (ref 5–12)
NEUTROPHILS NFR BLD AUTO: 4.12 10*3/MM3 (ref 1.7–7)
NEUTROPHILS NFR BLD AUTO: 61 % (ref 42.7–76)
NITRITE UR QL STRIP: NEGATIVE
NRBC BLD AUTO-RTO: 0 /100 WBC (ref 0–0.2)
PH UR STRIP.AUTO: 7 [PH] (ref 4.5–8)
PLATELET # BLD AUTO: 261 10*3/MM3 (ref 140–450)
PMV BLD AUTO: 10.3 FL (ref 6–12)
POTASSIUM SERPL-SCNC: 4.2 MMOL/L (ref 3.5–5.2)
PROT SERPL-MCNC: 6.5 G/DL (ref 6–8.5)
PROT UR QL STRIP: NEGATIVE
RBC # BLD AUTO: 4.09 10*6/MM3 (ref 3.77–5.28)
SODIUM SERPL-SCNC: 139 MMOL/L (ref 136–145)
SP GR UR STRIP: 1.02 (ref 1–1.03)
UROBILINOGEN UR QL STRIP: NORMAL
WBC # BLD AUTO: 6.77 10*3/MM3 (ref 3.4–10.8)

## 2020-12-23 PROCEDURE — 81003 URINALYSIS AUTO W/O SCOPE: CPT | Performed by: EMERGENCY MEDICINE

## 2020-12-23 PROCEDURE — 83690 ASSAY OF LIPASE: CPT | Performed by: EMERGENCY MEDICINE

## 2020-12-23 PROCEDURE — 85025 COMPLETE CBC W/AUTO DIFF WBC: CPT | Performed by: EMERGENCY MEDICINE

## 2020-12-23 PROCEDURE — 0 DIATRIZOATE MEGLUMINE & SODIUM PER 1 ML: Performed by: EMERGENCY MEDICINE

## 2020-12-23 PROCEDURE — 74176 CT ABD & PELVIS W/O CONTRAST: CPT

## 2020-12-23 PROCEDURE — 99284 EMERGENCY DEPT VISIT MOD MDM: CPT | Performed by: EMERGENCY MEDICINE

## 2020-12-23 PROCEDURE — 80053 COMPREHEN METABOLIC PANEL: CPT | Performed by: EMERGENCY MEDICINE

## 2020-12-23 PROCEDURE — 99283 EMERGENCY DEPT VISIT LOW MDM: CPT

## 2020-12-23 RX ORDER — METRONIDAZOLE 500 MG/1
500 TABLET ORAL 2 TIMES DAILY
Qty: 14 TABLET | Refills: 0 | Status: SHIPPED | OUTPATIENT
Start: 2020-12-23 | End: 2020-12-30

## 2020-12-23 RX ORDER — LEVOFLOXACIN 500 MG/1
500 TABLET, FILM COATED ORAL ONCE
Status: COMPLETED | OUTPATIENT
Start: 2020-12-23 | End: 2020-12-23

## 2020-12-23 RX ORDER — SODIUM CHLORIDE 0.9 % (FLUSH) 0.9 %
10 SYRINGE (ML) INJECTION AS NEEDED
Status: DISCONTINUED | OUTPATIENT
Start: 2020-12-23 | End: 2020-12-23 | Stop reason: HOSPADM

## 2020-12-23 RX ORDER — DICYCLOMINE HCL 20 MG
20 TABLET ORAL EVERY 8 HOURS PRN
Qty: 20 TABLET | Refills: 0 | Status: SHIPPED | OUTPATIENT
Start: 2020-12-23 | End: 2021-03-17

## 2020-12-23 RX ORDER — CIPROFLOXACIN 500 MG/1
500 TABLET, FILM COATED ORAL 2 TIMES DAILY
Qty: 14 TABLET | Refills: 0 | Status: SHIPPED | OUTPATIENT
Start: 2020-12-23 | End: 2020-12-30

## 2020-12-23 RX ORDER — METRONIDAZOLE 500 MG/1
500 TABLET ORAL ONCE
Status: COMPLETED | OUTPATIENT
Start: 2020-12-23 | End: 2020-12-23

## 2020-12-23 RX ADMIN — LEVOFLOXACIN 500 MG: 500 TABLET, FILM COATED ORAL at 19:39

## 2020-12-23 RX ADMIN — METRONIDAZOLE 500 MG: 500 TABLET, FILM COATED ORAL at 19:39

## 2020-12-23 RX ADMIN — DIATRIZOATE MEGLUMINE AND DIATRIZOATE SODIUM 30 ML: 600; 100 SOLUTION ORAL; RECTAL at 19:01
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
## 2020-12-24 NOTE — DISCHARGE INSTRUCTIONS
Recommend gentle diet and plenty of fluids as tolerated.  Please return to the emergency room for any worsening pain, fevers, nausea, vomiting, weakness or any other concerns.

## 2021-02-17 ENCOUNTER — TRANSCRIBE ORDERS (OUTPATIENT)
Dept: ADMINISTRATIVE | Facility: HOSPITAL | Age: 76
End: 2021-02-17

## 2021-02-17 DIAGNOSIS — M81.0 OSTEOPOROSIS OF MULTIPLE SITES: Primary | ICD-10-CM

## 2021-03-09 ENCOUNTER — APPOINTMENT (OUTPATIENT)
Dept: BONE DENSITY | Facility: HOSPITAL | Age: 76
End: 2021-03-09

## 2021-03-09 DIAGNOSIS — M81.0 OSTEOPOROSIS OF MULTIPLE SITES: ICD-10-CM

## 2021-03-09 PROCEDURE — 77080 DXA BONE DENSITY AXIAL: CPT

## 2021-03-17 ENCOUNTER — OFFICE VISIT (OUTPATIENT)
Dept: CARDIOLOGY | Facility: CLINIC | Age: 76
End: 2021-03-17

## 2021-03-17 ENCOUNTER — TRANSCRIBE ORDERS (OUTPATIENT)
Dept: ADMINISTRATIVE | Facility: HOSPITAL | Age: 76
End: 2021-03-17

## 2021-03-17 VITALS
BODY MASS INDEX: 26.63 KG/M2 | WEIGHT: 156 LBS | DIASTOLIC BLOOD PRESSURE: 88 MMHG | HEIGHT: 64 IN | HEART RATE: 62 BPM | SYSTOLIC BLOOD PRESSURE: 140 MMHG

## 2021-03-17 DIAGNOSIS — R07.89 OTHER CHEST PAIN: ICD-10-CM

## 2021-03-17 DIAGNOSIS — R00.2 PALPITATIONS: Primary | ICD-10-CM

## 2021-03-17 DIAGNOSIS — E11.9 TYPE 2 DIABETES MELLITUS WITHOUT COMPLICATION, WITH LONG-TERM CURRENT USE OF INSULIN (HCC): ICD-10-CM

## 2021-03-17 DIAGNOSIS — Z01.818 OTHER SPECIFIED PRE-OPERATIVE EXAMINATION: Primary | ICD-10-CM

## 2021-03-17 DIAGNOSIS — Z79.4 TYPE 2 DIABETES MELLITUS WITHOUT COMPLICATION, WITH LONG-TERM CURRENT USE OF INSULIN (HCC): ICD-10-CM

## 2021-03-17 DIAGNOSIS — I48.0 PAROXYSMAL ATRIAL FIBRILLATION (HCC): ICD-10-CM

## 2021-03-17 DIAGNOSIS — I10 ESSENTIAL HYPERTENSION: Chronic | ICD-10-CM

## 2021-03-17 DIAGNOSIS — I47.1 SVT (SUPRAVENTRICULAR TACHYCARDIA) (HCC): ICD-10-CM

## 2021-03-17 DIAGNOSIS — I48.0 PAF (PAROXYSMAL ATRIAL FIBRILLATION) (HCC): Chronic | ICD-10-CM

## 2021-03-17 PROCEDURE — 93000 ELECTROCARDIOGRAM COMPLETE: CPT | Performed by: INTERNAL MEDICINE

## 2021-03-17 PROCEDURE — 99214 OFFICE O/P EST MOD 30 MIN: CPT | Performed by: INTERNAL MEDICINE

## 2021-03-17 RX ORDER — DIPHENOXYLATE HYDROCHLORIDE AND ATROPINE SULFATE 2.5; .025 MG/1; MG/1
1 TABLET ORAL DAILY
COMMUNITY
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
## 2021-03-18 RX ORDER — ATENOLOL 50 MG/1
TABLET ORAL
Qty: 90 TABLET | Refills: 3 | Status: SHIPPED | OUTPATIENT
Start: 2021-03-18 | End: 2022-03-30 | Stop reason: SDUPTHER

## 2021-03-22 ENCOUNTER — LAB (OUTPATIENT)
Dept: LAB | Facility: HOSPITAL | Age: 76
End: 2021-03-22

## 2021-03-22 DIAGNOSIS — Z01.818 OTHER SPECIFIED PRE-OPERATIVE EXAMINATION: ICD-10-CM

## 2021-03-22 LAB — SARS-COV-2 RNA PNL SPEC NAA+PROBE: NOT DETECTED

## 2021-03-22 PROCEDURE — C9803 HOPD COVID-19 SPEC COLLECT: HCPCS

## 2021-03-22 PROCEDURE — 87635 SARS-COV-2 COVID-19 AMP PRB: CPT | Performed by: OBSTETRICS & GYNECOLOGY

## 2021-03-24 ENCOUNTER — HOSPITAL ENCOUNTER (OUTPATIENT)
Dept: NUCLEAR MEDICINE | Facility: HOSPITAL | Age: 76
Discharge: HOME | End: 2021-03-24

## 2021-03-24 ENCOUNTER — HOSPITAL ENCOUNTER (OUTPATIENT)
Dept: CARDIOLOGY | Facility: HOSPITAL | Age: 76
Discharge: HOME | End: 2021-03-24

## 2021-03-24 ENCOUNTER — TELEPHONE (OUTPATIENT)
Dept: CARDIOLOGY | Facility: CLINIC | Age: 76
End: 2021-03-24

## 2021-03-24 DIAGNOSIS — Z79.4 TYPE 2 DIABETES MELLITUS WITHOUT COMPLICATION, WITH LONG-TERM CURRENT USE OF INSULIN (HCC): ICD-10-CM

## 2021-03-24 DIAGNOSIS — R07.89 OTHER CHEST PAIN: ICD-10-CM

## 2021-03-24 DIAGNOSIS — R00.2 PALPITATIONS: ICD-10-CM

## 2021-03-24 DIAGNOSIS — I47.1 SVT (SUPRAVENTRICULAR TACHYCARDIA) (HCC): ICD-10-CM

## 2021-03-24 DIAGNOSIS — I48.0 PAROXYSMAL ATRIAL FIBRILLATION (HCC): ICD-10-CM

## 2021-03-24 DIAGNOSIS — I10 ESSENTIAL HYPERTENSION: ICD-10-CM

## 2021-03-24 DIAGNOSIS — E11.9 TYPE 2 DIABETES MELLITUS WITHOUT COMPLICATION, WITH LONG-TERM CURRENT USE OF INSULIN (HCC): ICD-10-CM

## 2021-03-24 DIAGNOSIS — I48.0 PAF (PAROXYSMAL ATRIAL FIBRILLATION) (HCC): ICD-10-CM

## 2021-03-24 LAB
BH CV NUCLEAR PRIOR STUDY: 3
BH CV REST NUCLEAR ISOTOPE DOSE: 11.6 MCI
BH CV STRESS BP STAGE 1: NORMAL
BH CV STRESS COMMENTS STAGE 1: NORMAL
BH CV STRESS DOSE REGADENOSON STAGE 1: 0.4
BH CV STRESS DURATION MIN STAGE 1: 0
BH CV STRESS DURATION SEC STAGE 1: 30
BH CV STRESS HR STAGE 1: 68
BH CV STRESS NUCLEAR ISOTOPE DOSE: 33.6 MCI
BH CV STRESS O2 STAGE 1: 100
BH CV STRESS PROTOCOL 1: NORMAL
BH CV STRESS RECOVERY BP: NORMAL MMHG
BH CV STRESS RECOVERY HR: 91 BPM
BH CV STRESS RECOVERY O2: 99 %
BH CV STRESS STAGE 1: 1
LV EF NUC BP: 97 %
MAXIMAL PREDICTED HEART RATE: 143 BPM
PERCENT MAX PREDICTED HR: 72.03 %
STRESS BASELINE BP: NORMAL MMHG
STRESS BASELINE HR: 69 BPM
STRESS O2 SAT REST: 99 %
STRESS PERCENT HR: 85 %
STRESS POST ESTIMATED WORKLOAD: 1 METS
STRESS POST EXERCISE DUR SEC: 30 SEC
STRESS POST O2 SAT PEAK: 100 %
STRESS POST PEAK BP: NORMAL MMHG
STRESS POST PEAK HR: 103 BPM
STRESS TARGET HR: 122 BPM

## 2021-03-24 PROCEDURE — 0 TECHNETIUM SESTAMIBI: Performed by: INTERNAL MEDICINE

## 2021-03-24 PROCEDURE — 78452 HT MUSCLE IMAGE SPECT MULT: CPT | Performed by: INTERNAL MEDICINE

## 2021-03-24 PROCEDURE — A9500 TC99M SESTAMIBI: HCPCS | Performed by: INTERNAL MEDICINE

## 2021-03-24 PROCEDURE — 78452 HT MUSCLE IMAGE SPECT MULT: CPT

## 2021-03-24 PROCEDURE — 93018 CV STRESS TEST I&R ONLY: CPT | Performed by: INTERNAL MEDICINE

## 2021-03-24 PROCEDURE — 25010000002 REGADENOSON 0.4 MG/5ML SOLUTION: Performed by: INTERNAL MEDICINE

## 2021-03-24 PROCEDURE — 93017 CV STRESS TEST TRACING ONLY: CPT

## 2021-03-24 PROCEDURE — 93016 CV STRESS TEST SUPVJ ONLY: CPT | Performed by: INTERNAL MEDICINE

## 2021-03-24 RX ADMIN — REGADENOSON 0.4 MG: 0.08 INJECTION, SOLUTION INTRAVENOUS at 09:53

## 2021-03-24 RX ADMIN — TECHNETIUM TC 99M SESTAMIBI 1 DOSE: 1 INJECTION INTRAVENOUS at 09:53

## 2021-03-24 RX ADMIN — TECHNETIUM TC 99M SESTAMIBI 1 DOSE: 1 INJECTION INTRAVENOUS at 08:01

## 2021-03-24 NOTE — TELEPHONE ENCOUNTER
Patient informed that stress test is normal. She denies chest pain. She occasionally feels her heart racing.

## 2021-07-09 ENCOUNTER — TRANSCRIBE ORDERS (OUTPATIENT)
Dept: ADMINISTRATIVE | Facility: HOSPITAL | Age: 76
End: 2021-07-09

## 2021-07-09 DIAGNOSIS — Z12.31 ENCOUNTER FOR SCREENING MAMMOGRAM FOR MALIGNANT NEOPLASM OF BREAST: Primary | ICD-10-CM

## 2021-07-22 ENCOUNTER — APPOINTMENT (OUTPATIENT)
Dept: MAMMOGRAPHY | Facility: HOSPITAL | Age: 76
End: 2021-07-22

## 2021-07-26 ENCOUNTER — HOSPITAL ENCOUNTER (OUTPATIENT)
Dept: MAMMOGRAPHY | Facility: HOSPITAL | Age: 76
End: 2021-07-26

## 2021-07-28 ENCOUNTER — HOSPITAL ENCOUNTER (OUTPATIENT)
Dept: MAMMOGRAPHY | Facility: HOSPITAL | Age: 76
Discharge: HOME | End: 2021-07-28
Admitting: FAMILY MEDICINE

## 2021-07-28 DIAGNOSIS — Z12.31 ENCOUNTER FOR SCREENING MAMMOGRAM FOR MALIGNANT NEOPLASM OF BREAST: ICD-10-CM

## 2021-07-28 PROCEDURE — 77067 SCR MAMMO BI INCL CAD: CPT

## 2021-07-28 PROCEDURE — 77063 BREAST TOMOSYNTHESIS BI: CPT

## 2021-10-25 ENCOUNTER — HOSPITAL ENCOUNTER (EMERGENCY)
Facility: HOSPITAL | Age: 76
Discharge: HOME | End: 2021-10-25
Attending: EMERGENCY MEDICINE | Admitting: EMERGENCY MEDICINE

## 2021-10-25 ENCOUNTER — APPOINTMENT (OUTPATIENT)
Dept: CT IMAGING | Facility: HOSPITAL | Age: 76
End: 2021-10-25

## 2021-10-25 ENCOUNTER — APPOINTMENT (OUTPATIENT)
Dept: GENERAL RADIOLOGY | Facility: HOSPITAL | Age: 76
End: 2021-10-25

## 2021-10-25 VITALS
HEART RATE: 67 BPM | OXYGEN SATURATION: 96 % | RESPIRATION RATE: 17 BRPM | HEIGHT: 64 IN | BODY MASS INDEX: 26.46 KG/M2 | TEMPERATURE: 98.8 F | DIASTOLIC BLOOD PRESSURE: 82 MMHG | SYSTOLIC BLOOD PRESSURE: 154 MMHG | WEIGHT: 155 LBS

## 2021-10-25 DIAGNOSIS — R10.31 RIGHT LOWER QUADRANT ABDOMINAL PAIN: Primary | ICD-10-CM

## 2021-10-25 LAB
ALBUMIN SERPL-MCNC: 4.1 G/DL (ref 3.5–5.2)
ALBUMIN/GLOB SERPL: 1.5 G/DL
ALP SERPL-CCNC: 79 U/L (ref 39–117)
ALT SERPL W P-5'-P-CCNC: 14 U/L (ref 1–33)
ANION GAP SERPL CALCULATED.3IONS-SCNC: 6.8 MMOL/L (ref 5–15)
AST SERPL-CCNC: 18 U/L (ref 1–32)
BASOPHILS # BLD AUTO: 0.05 10*3/MM3 (ref 0–0.2)
BASOPHILS NFR BLD AUTO: 0.7 % (ref 0–1.5)
BILIRUB SERPL-MCNC: 0.5 MG/DL (ref 0–1.2)
BILIRUB UR QL STRIP: NEGATIVE
BUN SERPL-MCNC: 8 MG/DL (ref 8–23)
BUN/CREAT SERPL: 11 (ref 7–25)
CALCIUM SPEC-SCNC: 8.9 MG/DL (ref 8.6–10.5)
CHLORIDE SERPL-SCNC: 100 MMOL/L (ref 98–107)
CLARITY UR: CLEAR
CO2 SERPL-SCNC: 29.2 MMOL/L (ref 22–29)
COLOR UR: YELLOW
CREAT SERPL-MCNC: 0.73 MG/DL (ref 0.57–1)
DEPRECATED RDW RBC AUTO: 41.3 FL (ref 37–54)
EOSINOPHIL # BLD AUTO: 0.1 10*3/MM3 (ref 0–0.4)
EOSINOPHIL NFR BLD AUTO: 1.4 % (ref 0.3–6.2)
ERYTHROCYTE [DISTWIDTH] IN BLOOD BY AUTOMATED COUNT: 12.5 % (ref 12.3–15.4)
FLUAV RNA RESP QL NAA+PROBE: NOT DETECTED
FLUBV RNA RESP QL NAA+PROBE: NOT DETECTED
GFR SERPL CREATININE-BSD FRML MDRD: 77 ML/MIN/1.73
GLOBULIN UR ELPH-MCNC: 2.7 GM/DL
GLUCOSE SERPL-MCNC: 242 MG/DL (ref 65–99)
GLUCOSE UR STRIP-MCNC: ABNORMAL MG/DL
HCT VFR BLD AUTO: 39.5 % (ref 34–46.6)
HGB BLD-MCNC: 12.8 G/DL (ref 12–15.9)
HGB UR QL STRIP.AUTO: NEGATIVE
IMM GRANULOCYTES # BLD AUTO: 0.02 10*3/MM3 (ref 0–0.05)
IMM GRANULOCYTES NFR BLD AUTO: 0.3 % (ref 0–0.5)
KETONES UR QL STRIP: NEGATIVE
LEUKOCYTE ESTERASE UR QL STRIP.AUTO: NEGATIVE
LYMPHOCYTES # BLD AUTO: 2.18 10*3/MM3 (ref 0.7–3.1)
LYMPHOCYTES NFR BLD AUTO: 30.4 % (ref 19.6–45.3)
MCH RBC QN AUTO: 28.9 PG (ref 26.6–33)
MCHC RBC AUTO-ENTMCNC: 32.4 G/DL (ref 31.5–35.7)
MCV RBC AUTO: 89.2 FL (ref 79–97)
MONOCYTES # BLD AUTO: 0.46 10*3/MM3 (ref 0.1–0.9)
MONOCYTES NFR BLD AUTO: 6.4 % (ref 5–12)
NEUTROPHILS NFR BLD AUTO: 4.36 10*3/MM3 (ref 1.7–7)
NEUTROPHILS NFR BLD AUTO: 60.8 % (ref 42.7–76)
NITRITE UR QL STRIP: NEGATIVE
NRBC BLD AUTO-RTO: 0 /100 WBC (ref 0–0.2)
PH UR STRIP.AUTO: 6 [PH] (ref 4.5–8)
PLATELET # BLD AUTO: 268 10*3/MM3 (ref 140–450)
PMV BLD AUTO: 10 FL (ref 6–12)
POTASSIUM SERPL-SCNC: 4.4 MMOL/L (ref 3.5–5.2)
PROT SERPL-MCNC: 6.8 G/DL (ref 6–8.5)
PROT UR QL STRIP: NEGATIVE
QT INTERVAL: 410 MS
RBC # BLD AUTO: 4.43 10*6/MM3 (ref 3.77–5.28)
SARS-COV-2 RNA RESP QL NAA+PROBE: NOT DETECTED
SODIUM SERPL-SCNC: 136 MMOL/L (ref 136–145)
SP GR UR STRIP: 1.02 (ref 1–1.03)
TROPONIN T SERPL-MCNC: <0.01 NG/ML (ref 0–0.03)
TSH SERPL DL<=0.05 MIU/L-ACNC: 1.9 UIU/ML (ref 0.27–4.2)
UROBILINOGEN UR QL STRIP: ABNORMAL
WBC # BLD AUTO: 7.17 10*3/MM3 (ref 3.4–10.8)

## 2021-10-25 PROCEDURE — 93010 ELECTROCARDIOGRAM REPORT: CPT | Performed by: INTERNAL MEDICINE

## 2021-10-25 PROCEDURE — 87636 SARSCOV2 & INF A&B AMP PRB: CPT | Performed by: EMERGENCY MEDICINE

## 2021-10-25 PROCEDURE — 84484 ASSAY OF TROPONIN QUANT: CPT | Performed by: EMERGENCY MEDICINE

## 2021-10-25 PROCEDURE — 99282 EMERGENCY DEPT VISIT SF MDM: CPT | Performed by: EMERGENCY MEDICINE

## 2021-10-25 PROCEDURE — 81003 URINALYSIS AUTO W/O SCOPE: CPT | Performed by: EMERGENCY MEDICINE

## 2021-10-25 PROCEDURE — 80053 COMPREHEN METABOLIC PANEL: CPT | Performed by: EMERGENCY MEDICINE

## 2021-10-25 PROCEDURE — 99283 EMERGENCY DEPT VISIT LOW MDM: CPT

## 2021-10-25 PROCEDURE — 84443 ASSAY THYROID STIM HORMONE: CPT | Performed by: EMERGENCY MEDICINE

## 2021-10-25 PROCEDURE — 93005 ELECTROCARDIOGRAM TRACING: CPT | Performed by: EMERGENCY MEDICINE

## 2021-10-25 PROCEDURE — 74176 CT ABD & PELVIS W/O CONTRAST: CPT

## 2021-10-25 PROCEDURE — 85025 COMPLETE CBC W/AUTO DIFF WBC: CPT | Performed by: EMERGENCY MEDICINE

## 2021-10-25 PROCEDURE — 71045 X-RAY EXAM CHEST 1 VIEW: CPT

## 2021-10-25 RX ORDER — DICYCLOMINE HCL 20 MG
20 TABLET ORAL EVERY 6 HOURS
Qty: 30 TABLET | Refills: 0 | Status: SHIPPED | OUTPATIENT
Start: 2021-10-25 | End: 2023-03-01 | Stop reason: ALTCHOICE

## 2021-10-25 RX ORDER — ONDANSETRON 8 MG/1
8 TABLET, ORALLY DISINTEGRATING ORAL EVERY 8 HOURS PRN
Qty: 10 TABLET | Refills: 0 | Status: SHIPPED | OUTPATIENT
Start: 2021-10-25 | End: 2023-03-01 | Stop reason: ALTCHOICE
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
## 2021-10-25 NOTE — ED NOTES
RN wearing appropriate PPE during pt triage. Pt wearing mask during encounter.      Maggy Ayala, RN  10/25/21 3794

## 2021-10-26 NOTE — DISCHARGE INSTRUCTIONS
Follow-up with your primary care provider this week.  Return to the emergency department if there is worsening pain, fever, vomiting not controlled for Zofran, worse in any way at all.

## 2022-03-16 ENCOUNTER — OFFICE VISIT (OUTPATIENT)
Dept: CARDIOLOGY | Facility: CLINIC | Age: 77
End: 2022-03-16

## 2022-03-16 VITALS
WEIGHT: 153 LBS | HEART RATE: 64 BPM | BODY MASS INDEX: 26.12 KG/M2 | HEIGHT: 64 IN | OXYGEN SATURATION: 98 % | RESPIRATION RATE: 16 BRPM | DIASTOLIC BLOOD PRESSURE: 74 MMHG | SYSTOLIC BLOOD PRESSURE: 120 MMHG

## 2022-03-16 DIAGNOSIS — I10 ESSENTIAL HYPERTENSION: Chronic | ICD-10-CM

## 2022-03-16 DIAGNOSIS — I48.0 PAF (PAROXYSMAL ATRIAL FIBRILLATION): Primary | Chronic | ICD-10-CM

## 2022-03-16 DIAGNOSIS — I47.1 SVT (SUPRAVENTRICULAR TACHYCARDIA): Chronic | ICD-10-CM

## 2022-03-16 PROCEDURE — 93000 ELECTROCARDIOGRAM COMPLETE: CPT | Performed by: INTERNAL MEDICINE

## 2022-03-16 PROCEDURE — 99214 OFFICE O/P EST MOD 30 MIN: CPT | Performed by: INTERNAL MEDICINE
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
## 2022-03-21 ENCOUNTER — TELEPHONE (OUTPATIENT)
Dept: CARDIOLOGY | Facility: CLINIC | Age: 77
End: 2022-03-21

## 2022-03-30 ENCOUNTER — TELEPHONE (OUTPATIENT)
Dept: CARDIOLOGY | Facility: CLINIC | Age: 77
End: 2022-03-30

## 2022-03-30 RX ORDER — ATENOLOL 100 MG/1
50 TABLET ORAL DAILY
Qty: 90 TABLET | Refills: 2 | Status: SHIPPED | OUTPATIENT
Start: 2022-03-30 | End: 2022-04-13 | Stop reason: SDUPTHER

## 2022-03-30 NOTE — TELEPHONE ENCOUNTER
Pt was last seen on 3/16/22. This was the plan.        She is calling today with an update. She has been doing better since her Atenolol was increase. She only had one episode that lasted 5 seconds.    PT#: [phone]

## 2022-04-04 ENCOUNTER — TELEPHONE (OUTPATIENT)
Dept: CARDIOLOGY | Facility: CLINIC | Age: 77
End: 2022-04-04

## 2022-04-04 RX ORDER — ATENOLOL 50 MG/1
TABLET ORAL
Qty: 90 TABLET | Refills: 3 | OUTPATIENT
Start: 2022-04-04

## 2022-04-04 NOTE — TELEPHONE ENCOUNTER
PT is needing clearance.    Pt is schedule for a endoscopy on 6/22/22 with Dr. Calderon.    Can pt hold Xarelto, If so how many days prior? Does pt need Lovenox?     Office #: [phone]  Fax#: [phone]

## 2022-04-13 RX ORDER — ATENOLOL 100 MG/1
100 TABLET ORAL DAILY
Qty: 90 TABLET | Refills: 2 | Status: SHIPPED | OUTPATIENT
Start: 2022-04-13 | End: 2023-02-13

## 2022-05-05 DIAGNOSIS — I48.0 PAF (PAROXYSMAL ATRIAL FIBRILLATION): Primary | ICD-10-CM

## 2022-05-05 DIAGNOSIS — I47.1 SVT (SUPRAVENTRICULAR TACHYCARDIA): ICD-10-CM

## 2022-05-05 DIAGNOSIS — R00.2 PALPITATIONS: ICD-10-CM

## 2022-05-13 ENCOUNTER — TELEPHONE (OUTPATIENT)
Dept: CARDIOLOGY | Facility: CLINIC | Age: 77
End: 2022-05-13

## 2022-05-13 NOTE — TELEPHONE ENCOUNTER
----- Message from Gibran Meyer III, MD sent at 5/13/2022  4:23 PM EDT -----  Call patient, did have some episodes of SVT on the monitor, have her double up on the atenolol and take 2 tablets (100 mg total) each morning, report back in a week

## 2022-06-07 ENCOUNTER — TELEPHONE (OUTPATIENT)
Dept: CARDIOLOGY | Facility: CLINIC | Age: 77
End: 2022-06-07

## 2022-06-14 ENCOUNTER — TELEPHONE (OUTPATIENT)
Dept: CARDIOLOGY | Facility: CLINIC | Age: 77
End: 2022-06-14

## 2022-06-14 NOTE — TELEPHONE ENCOUNTER
Pt is schedule for a colonoscopy on 6/22/22 with Dr. Calderon.    Can pt hold her xarelto 2 days prior?    Pt was last seen on 3/16/22.    PT #: [phone]

## 2022-06-27 ENCOUNTER — TELEPHONE (OUTPATIENT)
Dept: CARDIOLOGY | Facility: CLINIC | Age: 77
End: 2022-06-27

## 2022-06-27 NOTE — TELEPHONE ENCOUNTER
Pt called to see if she can take a supplement from a heart stand point since she is taking Xarelto.       Attempt to call pt back to see what the name of the supplement is. No answer, phone kept ringing and unable to LVM.    PH#: [phone]

## 2022-06-28 ENCOUNTER — TRANSCRIBE ORDERS (OUTPATIENT)
Dept: ADMINISTRATIVE | Facility: HOSPITAL | Age: 77
End: 2022-06-28

## 2022-06-28 DIAGNOSIS — Z12.39 SCREENING BREAST EXAMINATION: Primary | ICD-10-CM

## 2022-06-30 ENCOUNTER — HOSPITAL ENCOUNTER (OUTPATIENT)
Dept: MAMMOGRAPHY | Facility: HOSPITAL | Age: 77
End: 2022-06-30

## 2022-08-02 ENCOUNTER — HOSPITAL ENCOUNTER (OUTPATIENT)
Dept: MAMMOGRAPHY | Facility: HOSPITAL | Age: 77
Discharge: HOME | End: 2022-08-02
Admitting: FAMILY MEDICINE

## 2022-08-02 DIAGNOSIS — Z12.39 SCREENING BREAST EXAMINATION: ICD-10-CM

## 2022-08-02 PROCEDURE — 77063 BREAST TOMOSYNTHESIS BI: CPT

## 2022-08-02 PROCEDURE — 77067 SCR MAMMO BI INCL CAD: CPT

## 2022-11-07 ENCOUNTER — TRANSCRIBE ORDERS (OUTPATIENT)
Dept: ADMINISTRATIVE | Facility: HOSPITAL | Age: 77
End: 2022-11-07

## 2022-11-07 DIAGNOSIS — Z13.820 OSTEOPOROSIS SCREENING: Primary | ICD-10-CM

## 2022-12-15 ENCOUNTER — TELEPHONE (OUTPATIENT)
Dept: CARDIOLOGY | Facility: CLINIC | Age: 77
End: 2022-12-15

## 2022-12-16 DIAGNOSIS — I48.0 PAROXYSMAL ATRIAL FIBRILLATION: ICD-10-CM

## 2023-01-19 DIAGNOSIS — I48.0 PAROXYSMAL ATRIAL FIBRILLATION: ICD-10-CM

## 2023-02-13 RX ORDER — ATENOLOL 100 MG/1
TABLET ORAL
Qty: 90 TABLET | Refills: 2 | Status: SHIPPED | OUTPATIENT
Start: 2023-02-13

## 2023-03-01 ENCOUNTER — OFFICE VISIT (OUTPATIENT)
Dept: CARDIOLOGY | Facility: CLINIC | Age: 78
End: 2023-03-01
Payer: MEDICARE

## 2023-03-01 VITALS
WEIGHT: 152 LBS | SYSTOLIC BLOOD PRESSURE: 110 MMHG | HEIGHT: 64 IN | DIASTOLIC BLOOD PRESSURE: 62 MMHG | HEART RATE: 59 BPM | BODY MASS INDEX: 25.95 KG/M2

## 2023-03-01 DIAGNOSIS — I48.0 PAF (PAROXYSMAL ATRIAL FIBRILLATION): Primary | Chronic | ICD-10-CM

## 2023-03-01 DIAGNOSIS — I10 ESSENTIAL HYPERTENSION: Chronic | ICD-10-CM

## 2023-03-01 DIAGNOSIS — R00.2 PALPITATIONS: Chronic | ICD-10-CM

## 2023-03-01 DIAGNOSIS — I47.1 SVT (SUPRAVENTRICULAR TACHYCARDIA): Chronic | ICD-10-CM

## 2023-03-01 PROCEDURE — 93000 ELECTROCARDIOGRAM COMPLETE: CPT | Performed by: NURSE PRACTITIONER

## 2023-03-01 PROCEDURE — 99214 OFFICE O/P EST MOD 30 MIN: CPT | Performed by: NURSE PRACTITIONER
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
## 2023-03-08 ENCOUNTER — TRANSCRIBE ORDERS (OUTPATIENT)
Dept: ADMINISTRATIVE | Facility: HOSPITAL | Age: 78
End: 2023-03-08
Payer: MEDICARE

## 2023-03-08 DIAGNOSIS — M81.0 OSTEOPOROSIS, POST-MENOPAUSAL: Primary | ICD-10-CM

## 2023-03-13 ENCOUNTER — APPOINTMENT (OUTPATIENT)
Dept: BONE DENSITY | Facility: HOSPITAL | Age: 78
End: 2023-03-13
Payer: MEDICARE

## 2023-03-13 DIAGNOSIS — M81.0 OSTEOPOROSIS, POST-MENOPAUSAL: ICD-10-CM

## 2023-03-13 PROCEDURE — 77080 DXA BONE DENSITY AXIAL: CPT

## 2023-04-26 DIAGNOSIS — I48.0 PAROXYSMAL ATRIAL FIBRILLATION: ICD-10-CM

## 2023-04-26 NOTE — TELEPHONE ENCOUNTER
Called lvm adv pt that I have left her samples of Xarelto 20 MG tab requested at  for pickup.    Lot [id number]  Exp : 03-25    Sophie Hill MA  4/26/23 3:55P

## 2023-07-24 ENCOUNTER — HOSPITAL ENCOUNTER (EMERGENCY)
Facility: HOSPITAL | Age: 78
Discharge: HOME | End: 2023-07-24
Attending: EMERGENCY MEDICINE | Admitting: EMERGENCY MEDICINE
Payer: MEDICARE

## 2023-07-24 ENCOUNTER — APPOINTMENT (OUTPATIENT)
Dept: CT IMAGING | Facility: HOSPITAL | Age: 78
End: 2023-07-24
Payer: MEDICARE

## 2023-07-24 VITALS
WEIGHT: 152 LBS | DIASTOLIC BLOOD PRESSURE: 86 MMHG | HEIGHT: 64 IN | BODY MASS INDEX: 25.95 KG/M2 | OXYGEN SATURATION: 96 % | HEART RATE: 58 BPM | TEMPERATURE: 98.3 F | RESPIRATION RATE: 15 BRPM | SYSTOLIC BLOOD PRESSURE: 156 MMHG

## 2023-07-24 DIAGNOSIS — R10.9 LEFT FLANK PAIN: Primary | ICD-10-CM

## 2023-07-24 DIAGNOSIS — N85.8 UTERINE MASS: ICD-10-CM

## 2023-07-24 LAB
ALBUMIN SERPL-MCNC: 4.2 G/DL (ref 3.5–5.2)
ALBUMIN/GLOB SERPL: 1.6 G/DL
ALP SERPL-CCNC: 61 U/L (ref 39–117)
ALT SERPL W P-5'-P-CCNC: 9 U/L (ref 1–33)
ANION GAP SERPL CALCULATED.3IONS-SCNC: 11.4 MMOL/L (ref 5–15)
AST SERPL-CCNC: 13 U/L (ref 1–32)
BASOPHILS # BLD AUTO: 0.02 10*3/MM3 (ref 0–0.2)
BASOPHILS NFR BLD AUTO: 0.3 % (ref 0–1.5)
BILIRUB SERPL-MCNC: 0.3 MG/DL (ref 0–1.2)
BILIRUB UR QL STRIP: NEGATIVE
BUN SERPL-MCNC: 10 MG/DL (ref 8–23)
BUN/CREAT SERPL: 11.4 (ref 7–25)
CALCIUM SPEC-SCNC: 8.8 MG/DL (ref 8.6–10.5)
CHLORIDE SERPL-SCNC: 103 MMOL/L (ref 98–107)
CLARITY UR: CLEAR
CO2 SERPL-SCNC: 23.6 MMOL/L (ref 22–29)
COLOR UR: YELLOW
CREAT SERPL-MCNC: 0.88 MG/DL (ref 0.57–1)
DEPRECATED RDW RBC AUTO: 40.4 FL (ref 37–54)
EGFRCR SERPLBLD CKD-EPI 2021: 66.9 ML/MIN/1.73
EOSINOPHIL # BLD AUTO: 0.03 10*3/MM3 (ref 0–0.4)
EOSINOPHIL NFR BLD AUTO: 0.5 % (ref 0.3–6.2)
ERYTHROCYTE [DISTWIDTH] IN BLOOD BY AUTOMATED COUNT: 12.2 % (ref 12.3–15.4)
GLOBULIN UR ELPH-MCNC: 2.6 GM/DL
GLUCOSE SERPL-MCNC: 284 MG/DL (ref 65–99)
GLUCOSE UR STRIP-MCNC: ABNORMAL MG/DL
HCT VFR BLD AUTO: 39.4 % (ref 34–46.6)
HGB BLD-MCNC: 13.1 G/DL (ref 12–15.9)
HGB UR QL STRIP.AUTO: NEGATIVE
IMM GRANULOCYTES # BLD AUTO: 0.01 10*3/MM3 (ref 0–0.05)
IMM GRANULOCYTES NFR BLD AUTO: 0.2 % (ref 0–0.5)
KETONES UR QL STRIP: NEGATIVE
LEUKOCYTE ESTERASE UR QL STRIP.AUTO: NEGATIVE
LIPASE SERPL-CCNC: 25 U/L (ref 13–60)
LYMPHOCYTES # BLD AUTO: 1.74 10*3/MM3 (ref 0.7–3.1)
LYMPHOCYTES NFR BLD AUTO: 27.3 % (ref 19.6–45.3)
MCH RBC QN AUTO: 30.2 PG (ref 26.6–33)
MCHC RBC AUTO-ENTMCNC: 33.2 G/DL (ref 31.5–35.7)
MCV RBC AUTO: 90.8 FL (ref 79–97)
MONOCYTES # BLD AUTO: 0.47 10*3/MM3 (ref 0.1–0.9)
MONOCYTES NFR BLD AUTO: 7.4 % (ref 5–12)
NEUTROPHILS NFR BLD AUTO: 4.11 10*3/MM3 (ref 1.7–7)
NEUTROPHILS NFR BLD AUTO: 64.3 % (ref 42.7–76)
NITRITE UR QL STRIP: NEGATIVE
NRBC BLD AUTO-RTO: 0 /100 WBC (ref 0–0.2)
PH UR STRIP.AUTO: 6 [PH] (ref 4.5–8)
PLATELET # BLD AUTO: 252 10*3/MM3 (ref 140–450)
PMV BLD AUTO: 10.2 FL (ref 6–12)
POTASSIUM SERPL-SCNC: 4.1 MMOL/L (ref 3.5–5.2)
PROT SERPL-MCNC: 6.8 G/DL (ref 6–8.5)
PROT UR QL STRIP: NEGATIVE
RBC # BLD AUTO: 4.34 10*6/MM3 (ref 3.77–5.28)
SODIUM SERPL-SCNC: 138 MMOL/L (ref 136–145)
SP GR UR STRIP: 1.02 (ref 1–1.03)
UROBILINOGEN UR QL STRIP: ABNORMAL
WBC NRBC COR # BLD: 6.38 10*3/MM3 (ref 3.4–10.8)

## 2023-07-24 PROCEDURE — 99283 EMERGENCY DEPT VISIT LOW MDM: CPT

## 2023-07-24 PROCEDURE — 85025 COMPLETE CBC W/AUTO DIFF WBC: CPT | Performed by: EMERGENCY MEDICINE

## 2023-07-24 PROCEDURE — 93005 ELECTROCARDIOGRAM TRACING: CPT | Performed by: EMERGENCY MEDICINE

## 2023-07-24 PROCEDURE — 74176 CT ABD & PELVIS W/O CONTRAST: CPT

## 2023-07-24 PROCEDURE — 93010 ELECTROCARDIOGRAM REPORT: CPT | Performed by: INTERNAL MEDICINE

## 2023-07-24 PROCEDURE — 81003 URINALYSIS AUTO W/O SCOPE: CPT | Performed by: EMERGENCY MEDICINE

## 2023-07-24 PROCEDURE — 83690 ASSAY OF LIPASE: CPT | Performed by: EMERGENCY MEDICINE

## 2023-07-24 PROCEDURE — 80053 COMPREHEN METABOLIC PANEL: CPT | Performed by: EMERGENCY MEDICINE

## 2023-07-24 RX ORDER — TRAMADOL HYDROCHLORIDE 50 MG/1
50 TABLET ORAL EVERY 6 HOURS PRN
Qty: 20 TABLET | Refills: 0 | Status: SHIPPED | OUTPATIENT
Start: 2023-07-24

## 2023-07-25 LAB — QT INTERVAL: 419 MS

## 2023-07-25 NOTE — DISCHARGE INSTRUCTIONS
Take the Ultram as needed as prescribed for pain.  Follow-up with Dr. Salmon within 1 week.  Call Dr. Burden in the morning for a follow-up appointment this week.  Return to the emergency department there is worsening pain, worsening weight all.
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
## 2023-07-26 ENCOUNTER — OFFICE VISIT (OUTPATIENT)
Dept: OBSTETRICS AND GYNECOLOGY | Facility: CLINIC | Age: 78
End: 2023-07-26
Payer: MEDICARE

## 2023-07-26 VITALS
HEIGHT: 64 IN | WEIGHT: 152 LBS | BODY MASS INDEX: 25.95 KG/M2 | SYSTOLIC BLOOD PRESSURE: 140 MMHG | DIASTOLIC BLOOD PRESSURE: 82 MMHG

## 2023-07-26 DIAGNOSIS — E03.9 HYPOTHYROIDISM, UNSPECIFIED TYPE: ICD-10-CM

## 2023-07-26 DIAGNOSIS — Z79.4 TYPE 2 DIABETES MELLITUS WITHOUT COMPLICATION, WITH LONG-TERM CURRENT USE OF INSULIN: ICD-10-CM

## 2023-07-26 DIAGNOSIS — E11.9 TYPE 2 DIABETES MELLITUS WITHOUT COMPLICATION, WITH LONG-TERM CURRENT USE OF INSULIN: ICD-10-CM

## 2023-07-26 DIAGNOSIS — K59.00 CONSTIPATION, UNSPECIFIED CONSTIPATION TYPE: ICD-10-CM

## 2023-07-26 DIAGNOSIS — I47.1 SVT (SUPRAVENTRICULAR TACHYCARDIA): Primary | Chronic | ICD-10-CM

## 2023-07-26 DIAGNOSIS — I10 ESSENTIAL HYPERTENSION: Chronic | ICD-10-CM

## 2023-07-26 DIAGNOSIS — T19.3XXA FOREIGN BODY IN UTERUS, INITIAL ENCOUNTER: ICD-10-CM

## 2023-07-26 DIAGNOSIS — Z86.010 HISTORY OF COLON POLYPS: ICD-10-CM

## 2023-07-26 DIAGNOSIS — I48.0 PAF (PAROXYSMAL ATRIAL FIBRILLATION): Chronic | ICD-10-CM

## 2023-07-26 RX ORDER — METHYLPREDNISOLONE 32 MG/1
32 TABLET ORAL EVERY 24 HOURS
COMMUNITY
Start: 2023-04-15

## 2023-07-26 RX ORDER — TIZANIDINE 2 MG/1
TABLET ORAL
COMMUNITY
Start: 2023-07-21

## 2023-07-26 RX ORDER — PRAVASTATIN SODIUM 40 MG
TABLET ORAL
COMMUNITY
Start: 2023-04-23

## 2023-07-26 RX ORDER — IBANDRONATE SODIUM 150 MG/1
TABLET, FILM COATED ORAL
COMMUNITY

## 2023-07-26 RX ORDER — ALENDRONATE SODIUM 70 MG/1
70 TABLET ORAL
Qty: 4 TABLET | Refills: 11 | COMMUNITY
Start: 2023-03-16 | End: 2024-03-15

## 2023-07-26 NOTE — PROGRESS NOTES
"EVALUATION AND MANAGEMENT ENCOUNTER    S:  Chief Complaint   Patient presents with    Follow-up     U/S       HPI:  Carli Blair is a 78 y.o.  with No LMP recorded. Patient is postmenopausal. here to be evaluated for CT scan results done on :    There is a radiodense structure within the uterus that might be a  retained IUD. It is also present in . Please correlate further  clinically. If there is concern for pelvic pathology suggest follow-up  pelvic ultrasound.    U/s here in the office today: poss IUD? posterior to a fibroid.  Nl ovs.     Pt states she went to the ER Monday night for some L flank pain that radiated around to her back.    PT DENIES EVER USING AN IUD.  PT STATES SHE ONLY USED OCS FOR ABOUT A MONTH, YEARS AGO.    Review of Systems   Constitutional: Negative.    HENT: Negative.     Eyes: Negative.    Respiratory: Negative.     Cardiovascular: Negative.    Gastrointestinal: Negative.    Endocrine: Negative.    Musculoskeletal:  Positive for back pain.   Skin: Negative.    Allergic/Immunologic: Negative.    Neurological: Negative.    Hematological: Negative.    Psychiatric/Behavioral: Negative.   :    Patient reports that she is not currently experiencing any symptoms of urinary incontinence.      noTESTED FOR CHLAMYDIA?    .CESSATIONOPT    Vital Signs: /82   Ht 162.6 cm (64\")   Wt 68.9 kg (152 lb)   BMI 26.09 kg/m²      Brief Urine Lab Results  (Last result in the past 365 days)        Color   Clarity   Blood   Leuk Est   Nitrite   Protein   CREAT   Urine HCG        23 Yellow   Clear   Negative   Negative   Negative   Negative                   Physical Exam  Vitals and nursing note reviewed.   Constitutional:       Appearance: She is well-developed.   HENT:      Head: Normocephalic and atraumatic.   Cardiovascular:      Rate and Rhythm: Normal rate.   Pulmonary:      Effort: Pulmonary effort is normal.   Abdominal:      General: There is no distension.      " Palpations: Abdomen is soft. There is no mass.      Tenderness: There is no abdominal tenderness. There is no guarding.   Genitourinary:     Vagina: No vaginal discharge.   Musculoskeletal:         General: No tenderness or deformity. Normal range of motion.      Cervical back: Normal range of motion.   Skin:     General: Skin is warm and dry.      Coloration: Skin is not pale.      Findings: No erythema or rash.   Neurological:      Mental Status: She is alert and oriented to person, place, and time.   Psychiatric:         Behavior: Behavior normal.         Thought Content: Thought content normal.         Judgment: Judgment normal.         IMPRESSION:      Diagnoses and all orders for this visit:    1. SVT (supraventricular tachycardia) (Primary)    2. Essential hypertension    3. PAF (paroxysmal atrial fibrillation)    4. Type 2 diabetes mellitus without complication, with long-term current use of insulin    5. Hypothyroidism, unspecified type    6. Constipation, unspecified constipation type    7. History of colon polyps    8. Foreign body in uterus, initial encounter      I think this could be a calcification of a fibroid that has picked up on imaging.      PLAN:      Rpt u/s 2 months.  Pt declined hysteroscopic evaluation at this point, since she never used and IUD.    Pt instructed to call for results of any testing done today if she does not hear from us, and that failure to do so could result in inadequate treatment . Pt verbalized her understanding.     Return in about 2 months (around 9/26/2023) for Recheck..  Instructions and precautions given.     Time Spent: I spent 30+ minutes caring for Carli on this date of service. This time includes time spent by me in the following activities: preparing for the visit, reviewing tests, obtaining and/or reviewing a separately obtained history, performing a medically appropriate examination and/or evaluation, counseling and educating the patient/family/caregiver,  ordering medications, tests, or procedures, referring and communicating with other health care professionals, documenting information in the medical record, independently interpreting results and communicating that information with the patient/family/caregiver, and care coordination.      Leo Torres MD  11:26 EDT   07/26/23

## 2023-08-14 ENCOUNTER — TRANSCRIBE ORDERS (OUTPATIENT)
Dept: ADMINISTRATIVE | Facility: HOSPITAL | Age: 78
End: 2023-08-14
Payer: MEDICARE

## 2023-08-14 DIAGNOSIS — Z12.31 VISIT FOR SCREENING MAMMOGRAM: Primary | ICD-10-CM

## 2023-08-21 ENCOUNTER — HOSPITAL ENCOUNTER (OUTPATIENT)
Dept: MAMMOGRAPHY | Facility: HOSPITAL | Age: 78
Discharge: HOME | End: 2023-08-21
Admitting: FAMILY MEDICINE
Payer: MEDICARE

## 2023-08-21 DIAGNOSIS — Z12.31 VISIT FOR SCREENING MAMMOGRAM: ICD-10-CM

## 2023-08-21 PROCEDURE — 77063 BREAST TOMOSYNTHESIS BI: CPT

## 2023-08-21 PROCEDURE — 77067 SCR MAMMO BI INCL CAD: CPT

## 2023-09-08 ENCOUNTER — TELEPHONE (OUTPATIENT)
Dept: CARDIOLOGY | Facility: CLINIC | Age: 78
End: 2023-09-08
Payer: MEDICARE

## 2023-09-08 NOTE — TELEPHONE ENCOUNTER
[phone]    Pt called and lm requesting samples of Xarelto 20mg-please advise.    [facility]

## 2023-09-26 ENCOUNTER — OFFICE VISIT (OUTPATIENT)
Dept: OBSTETRICS AND GYNECOLOGY | Facility: CLINIC | Age: 78
End: 2023-09-26
Payer: MEDICARE

## 2023-09-26 VITALS
SYSTOLIC BLOOD PRESSURE: 130 MMHG | BODY MASS INDEX: 27.25 KG/M2 | WEIGHT: 159.6 LBS | DIASTOLIC BLOOD PRESSURE: 88 MMHG | HEIGHT: 64 IN

## 2023-09-26 DIAGNOSIS — Z13.89 SCREENING FOR GENITOURINARY CONDITION: Primary | ICD-10-CM

## 2023-09-26 LAB
BILIRUB BLD-MCNC: NEGATIVE MG/DL
CLARITY, POC: CLEAR
COLOR UR: YELLOW
GLUCOSE UR STRIP-MCNC: NEGATIVE MG/DL
KETONES UR QL: NEGATIVE
LEUKOCYTE EST, POC: NEGATIVE
NITRITE UR-MCNC: NEGATIVE MG/ML
PH UR: 5 [PH] (ref 5–8)
PROT UR STRIP-MCNC: NEGATIVE MG/DL
RBC # UR STRIP: NEGATIVE /UL
SP GR UR: 1 (ref 1–1.03)
UROBILINOGEN UR QL: NORMAL

## 2023-09-26 NOTE — PROGRESS NOTES
"EVALUATION AND MANAGEMENT ENCOUNTER    S:  Chief Complaint   Patient presents with    Follow-up     US        HPI:  Carli Blair is a 78 y.o.  with No LMP recorded. Patient is postmenopausal. here for f/u u/s that showed possible endometrial mass.  U/s today showed no change, so the thought is that what was seen was calcifications on the interior surface of the fibroid.  Pt is without complaints.    Review of Systems   Constitutional: Negative.    HENT: Negative.     Eyes: Negative.    Respiratory: Negative.     Cardiovascular: Negative.    Gastrointestinal: Negative.    Endocrine: Negative.    Musculoskeletal: Negative.    Skin: Negative.    Allergic/Immunologic: Negative.    Neurological: Negative.    Hematological: Negative.    Psychiatric/Behavioral: Negative.   :    Patient reports that she is not currently experiencing any symptoms of urinary incontinence.      noTESTED FOR CHLAMYDIA?    .CESSATIONOPT    Vital Signs: /88   Ht 162.6 cm (64.02\")   Wt 72.4 kg (159 lb 9.6 oz)   BMI 27.38 kg/m²      Brief Urine Lab Results  (Last result in the past 365 days)        Color   Clarity   Blood   Leuk Est   Nitrite   Protein   CREAT   Urine HCG        23 1126 Yellow   Clear   Negative   Negative   Negative   Negative                   Physical Exam  Vitals and nursing note reviewed.   Constitutional:       Appearance: She is well-developed.   HENT:      Head: Normocephalic and atraumatic.   Cardiovascular:      Rate and Rhythm: Normal rate.   Pulmonary:      Effort: Pulmonary effort is normal.   Abdominal:      General: There is no distension.      Palpations: Abdomen is soft. There is no mass.      Tenderness: There is no abdominal tenderness. There is no guarding.   Genitourinary:     Vagina: No vaginal discharge.   Musculoskeletal:         General: No tenderness or deformity. Normal range of motion.      Cervical back: Normal range of motion.   Skin:     General: Skin is warm and dry.      " Coloration: Skin is not pale.      Findings: No erythema or rash.   Neurological:      Mental Status: She is alert and oriented to person, place, and time.   Psychiatric:         Behavior: Behavior normal.         Thought Content: Thought content normal.         Judgment: Judgment normal.         IMPRESSION:      No pathology demonstrated.    Diagnoses and all orders for this visit:    1. Screening for genitourinary condition (Primary)  -     POC Urinalysis Dipstick          PLAN:      Rto prn    Pt instructed to call for results of any testing done today if she does not hear from us, and that failure to do so could result in inadequate treatment . Pt verbalized her understanding.     No follow-ups on file..  Instructions and precautions given.     Time Spent: I spent 20+ minutes caring for Carli on this date of service. This time includes time spent by me in the following activities: preparing for the visit, reviewing tests, obtaining and/or reviewing a separately obtained history, performing a medically appropriate examination and/or evaluation, counseling and educating the patient/family/caregiver, ordering medications, tests, or procedures, referring and communicating with other health care professionals, documenting information in the medical record, independently interpreting results and communicating that information with the patient/family/caregiver, and care coordination.      Leo Torres MD  11:38 EDT   09/26/23

## 2023-10-11 ENCOUNTER — TELEPHONE (OUTPATIENT)
Dept: CARDIOLOGY | Facility: CLINIC | Age: 78
End: 2023-10-11
Payer: MEDICARE

## 2023-10-11 NOTE — TELEPHONE ENCOUNTER
Carli Blair called to request Xarelto 20 mg samples at the [place] office.  Called [place] office and staff stated they have samples available and will place them up front for her to .  Notified patient and she verbalized understanding.    Thank you,  Inés ASHBY RN  Triage Nurse [facility]   08:54 EDT

## 2023-11-28 RX ORDER — ATENOLOL 100 MG/1
TABLET ORAL
Qty: 90 TABLET | Refills: 2 | Status: SHIPPED | OUTPATIENT
Start: 2023-11-28

## 2023-12-18 DIAGNOSIS — I48.0 PAROXYSMAL ATRIAL FIBRILLATION: ICD-10-CM

## 2024-03-06 ENCOUNTER — OFFICE VISIT (OUTPATIENT)
Dept: CARDIOLOGY | Facility: CLINIC | Age: 79
End: 2024-03-06
Payer: MEDICARE

## 2024-03-06 VITALS
HEIGHT: 64 IN | DIASTOLIC BLOOD PRESSURE: 80 MMHG | BODY MASS INDEX: 26.91 KG/M2 | SYSTOLIC BLOOD PRESSURE: 138 MMHG | HEART RATE: 58 BPM | WEIGHT: 157.6 LBS

## 2024-03-06 DIAGNOSIS — I48.0 PAF (PAROXYSMAL ATRIAL FIBRILLATION): Primary | ICD-10-CM

## 2024-03-06 DIAGNOSIS — I10 ESSENTIAL HYPERTENSION: ICD-10-CM

## 2024-03-06 DIAGNOSIS — I47.10 SVT (SUPRAVENTRICULAR TACHYCARDIA): ICD-10-CM

## 2024-03-06 DIAGNOSIS — R00.2 PALPITATIONS: ICD-10-CM

## 2024-03-06 PROCEDURE — 99214 OFFICE O/P EST MOD 30 MIN: CPT | Performed by: INTERNAL MEDICINE

## 2024-03-06 PROCEDURE — 93000 ELECTROCARDIOGRAM COMPLETE: CPT | Performed by: INTERNAL MEDICINE

## 2024-03-06 PROCEDURE — 3075F SYST BP GE 130 - 139MM HG: CPT | Performed by: INTERNAL MEDICINE

## 2024-03-06 PROCEDURE — 3078F DIAST BP <80 MM HG: CPT | Performed by: INTERNAL MEDICINE

## 2024-03-06 PROCEDURE — 1159F MED LIST DOCD IN RCRD: CPT | Performed by: INTERNAL MEDICINE

## 2024-03-06 PROCEDURE — 1160F RVW MEDS BY RX/DR IN RCRD: CPT | Performed by: INTERNAL MEDICINE

## 2024-03-06 RX ORDER — INSULIN LISPRO 100 [IU]/ML
12 INJECTION, SOLUTION INTRAVENOUS; SUBCUTANEOUS 3 TIMES DAILY
COMMUNITY
Start: 2024-02-20
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
## 2024-04-11 ENCOUNTER — TELEPHONE (OUTPATIENT)
Dept: CARDIOLOGY | Facility: CLINIC | Age: 79
End: 2024-04-11
Payer: MEDICARE

## 2024-06-19 DIAGNOSIS — I48.0 PAROXYSMAL ATRIAL FIBRILLATION: ICD-10-CM

## 2024-07-09 DIAGNOSIS — I48.0 PAROXYSMAL ATRIAL FIBRILLATION: ICD-10-CM

## 2024-08-23 ENCOUNTER — TRANSCRIBE ORDERS (OUTPATIENT)
Dept: ADMINISTRATIVE | Facility: HOSPITAL | Age: 79
End: 2024-08-23
Payer: MEDICARE

## 2024-08-23 DIAGNOSIS — M81.0 AGE RELATED OSTEOPOROSIS, UNSPECIFIED PATHOLOGICAL FRACTURE PRESENCE: Primary | ICD-10-CM

## 2024-09-04 RX ORDER — ATENOLOL 100 MG/1
100 TABLET ORAL DAILY
Qty: 90 TABLET | Refills: 2 | Status: SHIPPED | OUTPATIENT
Start: 2024-09-04

## 2024-09-30 ENCOUNTER — TRANSCRIBE ORDERS (OUTPATIENT)
Dept: ADMINISTRATIVE | Facility: HOSPITAL | Age: 79
End: 2024-09-30
Payer: MEDICARE

## 2024-09-30 DIAGNOSIS — Z12.31 SCREENING MAMMOGRAM FOR BREAST CANCER: Primary | ICD-10-CM

## 2024-10-04 DIAGNOSIS — I48.0 PAROXYSMAL ATRIAL FIBRILLATION: ICD-10-CM

## 2024-10-29 DIAGNOSIS — I48.0 PAROXYSMAL ATRIAL FIBRILLATION: ICD-10-CM

## 2024-11-14 DIAGNOSIS — I48.0 PAROXYSMAL ATRIAL FIBRILLATION: ICD-10-CM

## 2024-11-20 ENCOUNTER — HOSPITAL ENCOUNTER (OUTPATIENT)
Dept: MAMMOGRAPHY | Facility: HOSPITAL | Age: 79
Discharge: HOME | End: 2024-11-20
Admitting: FAMILY MEDICINE
Payer: MEDICARE

## 2024-11-20 DIAGNOSIS — Z12.31 SCREENING MAMMOGRAM FOR BREAST CANCER: ICD-10-CM

## 2024-11-20 PROCEDURE — 77063 BREAST TOMOSYNTHESIS BI: CPT

## 2024-11-20 PROCEDURE — 77063 BREAST TOMOSYNTHESIS BI: CPT | Performed by: RADIOLOGY

## 2024-11-20 PROCEDURE — 77067 SCR MAMMO BI INCL CAD: CPT

## 2024-11-20 PROCEDURE — 77067 SCR MAMMO BI INCL CAD: CPT | Performed by: RADIOLOGY

## 2024-12-27 ENCOUNTER — TELEPHONE (OUTPATIENT)
Dept: CARDIOLOGY | Facility: CLINIC | Age: 79
End: 2024-12-27
Payer: MEDICARE

## 2024-12-27 DIAGNOSIS — I48.0 PAROXYSMAL ATRIAL FIBRILLATION: ICD-10-CM

## 2025-02-05 ENCOUNTER — TELEPHONE (OUTPATIENT)
Dept: CARDIOLOGY | Facility: CLINIC | Age: 80
End: 2025-02-05
Payer: MEDICARE

## 2025-03-18 ENCOUNTER — TRANSCRIBE ORDERS (OUTPATIENT)
Dept: BONE DENSITY | Facility: HOSPITAL | Age: 80
End: 2025-03-18
Payer: MEDICARE

## 2025-03-18 DIAGNOSIS — M81.0 AGE-RELATED OSTEOPOROSIS WITHOUT CURRENT PATHOLOGICAL FRACTURE: Primary | ICD-10-CM

## 2025-03-18 DIAGNOSIS — Z78.0 MENOPAUSE: ICD-10-CM

## 2025-03-31 DIAGNOSIS — I48.0 PAROXYSMAL ATRIAL FIBRILLATION: ICD-10-CM

## 2025-04-07 ENCOUNTER — APPOINTMENT (OUTPATIENT)
Dept: BONE DENSITY | Facility: HOSPITAL | Age: 80
End: 2025-04-07
Payer: MEDICARE

## 2025-04-07 DIAGNOSIS — M81.0 AGE-RELATED OSTEOPOROSIS WITHOUT CURRENT PATHOLOGICAL FRACTURE: ICD-10-CM

## 2025-04-07 DIAGNOSIS — Z78.0 MENOPAUSE: ICD-10-CM

## 2025-04-07 PROCEDURE — 77080 DXA BONE DENSITY AXIAL: CPT

## 2025-04-11 ENCOUNTER — TELEPHONE (OUTPATIENT)
Dept: CARDIOLOGY | Facility: CLINIC | Age: 80
End: 2025-04-11
Payer: MEDICARE

## 2025-04-11 DIAGNOSIS — I48.0 PAROXYSMAL ATRIAL FIBRILLATION: ICD-10-CM

## 2025-05-07 ENCOUNTER — OFFICE VISIT (OUTPATIENT)
Dept: CARDIOLOGY | Facility: CLINIC | Age: 80
End: 2025-05-07
Payer: MEDICARE

## 2025-05-07 VITALS
BODY MASS INDEX: 26.75 KG/M2 | WEIGHT: 156.7 LBS | SYSTOLIC BLOOD PRESSURE: 130 MMHG | HEIGHT: 64 IN | DIASTOLIC BLOOD PRESSURE: 60 MMHG

## 2025-05-07 DIAGNOSIS — I10 ESSENTIAL HYPERTENSION: ICD-10-CM

## 2025-05-07 DIAGNOSIS — R00.2 PALPITATIONS: ICD-10-CM

## 2025-05-07 DIAGNOSIS — I47.10 SVT (SUPRAVENTRICULAR TACHYCARDIA): ICD-10-CM

## 2025-05-07 DIAGNOSIS — I48.0 PAF (PAROXYSMAL ATRIAL FIBRILLATION): Primary | Chronic | ICD-10-CM

## 2025-05-07 DIAGNOSIS — G47.33 OSA ON CPAP: ICD-10-CM

## 2025-05-07 PROCEDURE — 93000 ELECTROCARDIOGRAM COMPLETE: CPT | Performed by: INTERNAL MEDICINE

## 2025-05-07 PROCEDURE — 3075F SYST BP GE 130 - 139MM HG: CPT | Performed by: INTERNAL MEDICINE

## 2025-05-07 PROCEDURE — 99214 OFFICE O/P EST MOD 30 MIN: CPT | Performed by: INTERNAL MEDICINE

## 2025-05-07 PROCEDURE — 1160F RVW MEDS BY RX/DR IN RCRD: CPT | Performed by: INTERNAL MEDICINE

## 2025-05-07 PROCEDURE — 1159F MED LIST DOCD IN RCRD: CPT | Performed by: INTERNAL MEDICINE

## 2025-05-07 PROCEDURE — 3078F DIAST BP <80 MM HG: CPT | Performed by: INTERNAL MEDICINE
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
## 2025-05-08 DIAGNOSIS — I48.0 PAROXYSMAL ATRIAL FIBRILLATION: ICD-10-CM

## 2025-06-19 RX ORDER — ATENOLOL 100 MG/1
100 TABLET ORAL DAILY
Qty: 90 TABLET | Refills: 3 | Status: SHIPPED | OUTPATIENT
Start: 2025-06-19

## 2025-06-19 NOTE — TELEPHONE ENCOUNTER
Dr. Meyer is out of the office today.    Reviewed Dr. Meyer last office note from May.  Continue atenolol 100 mg daily.  Refilled.

## 2025-07-09 ENCOUNTER — TELEPHONE (OUTPATIENT)
Dept: CARDIOLOGY | Facility: CLINIC | Age: 80
End: 2025-07-09
Payer: MEDICARE

## 2025-07-09 DIAGNOSIS — I48.0 PAROXYSMAL ATRIAL FIBRILLATION: ICD-10-CM

## 2025-08-01 ENCOUNTER — TELEPHONE (OUTPATIENT)
Dept: CARDIOLOGY | Facility: CLINIC | Age: 80
End: 2025-08-01
Payer: MEDICARE

## 2025-08-01 DIAGNOSIS — I48.0 PAROXYSMAL ATRIAL FIBRILLATION: ICD-10-CM

## 2025-08-22 ENCOUNTER — TELEPHONE (OUTPATIENT)
Dept: CARDIOLOGY | Facility: CLINIC | Age: 80
End: 2025-08-22
Payer: MEDICARE

## 2025-08-22 DIAGNOSIS — I48.0 PAROXYSMAL ATRIAL FIBRILLATION: ICD-10-CM

## (undated) DEVICE — SUT VIC 1 TP 1MS/4 27IN DYED J650G

## (undated) DEVICE — THE TORRENT IRRIGATION SCOPE CONNECTOR IS USED WITH THE TORRENT IRRIGATION TUBING TO PROVIDE IRRIGATION FLUIDS SUCH AS STERILE WATER DURING GASTROINTESTINAL ENDOSCOPIC PROCEDURES WHEN USED IN CONJUNCTION WITH AN IRRIGATION PUMP (OR ELECTROSURGICAL UNIT).: Brand: TORRENT

## (undated) DEVICE — TUBING, SUCTION, 1/4" X 10', STRAIGHT: Brand: MEDLINE

## (undated) DEVICE — Device: Brand: DEFENDO AIR/WATER/SUCTION AND BIOPSY VALVE

## (undated) DEVICE — STPLR SKIN VISISTAT WD 35CT

## (undated) DEVICE — PIN TROC SIGNATURE PK/2

## (undated) DEVICE — GLV SURG TRIUMPH CLASSIC PF LTX 9 STRL

## (undated) DEVICE — CANN NASL CO2 TRULINK W/O2 A/

## (undated) DEVICE — SOL NACL 0.9PCT 1000ML

## (undated) DEVICE — PREMIUM WET SKIN PREP TRAY: Brand: MEDLINE INDUSTRIES, INC.

## (undated) DEVICE — TBG 02 CRUSH RESIST LF CLR 7FT

## (undated) DEVICE — BANDAGE,GAUZE,BULKEE II,4.5"X4.1YD,STRL: Brand: MEDLINE

## (undated) DEVICE — ENCORE® LATEX ORTHO SIZE 9, STERILE LATEX POWDER-FREE SURGICAL GLOVE: Brand: ENCORE

## (undated) DEVICE — GAUZE,SPONGE,FLUFF,6"X6.75",STRL,10/TRAY: Brand: MEDLINE

## (undated) DEVICE — DUAL CUT SAGITTAL BLADE

## (undated) DEVICE — TOTAL TRAY, URNMTR, SILV, LF, 16FR 10ML: Brand: MEDLINE

## (undated) DEVICE — DRAPE,REIN 53X77,STERILE: Brand: MEDLINE

## (undated) DEVICE — SUT VIC 0 CT 36IN J958H

## (undated) DEVICE — KT DRN EVAC WND PVC PCH WTROC RND 10F400

## (undated) DEVICE — FRCP BX RADJAW4 NDL 2.8 240CM LG OG BX40

## (undated) DEVICE — PK KN TOTL 40

## (undated) DEVICE — BITEBLOCK OMNI BLOC

## (undated) DEVICE — DRSNG WND GZ CURAD OIL EMULSION 3X8IN LF STRL 1PK